# Patient Record
Sex: MALE | Race: BLACK OR AFRICAN AMERICAN | Employment: PART TIME | URBAN - METROPOLITAN AREA
[De-identification: names, ages, dates, MRNs, and addresses within clinical notes are randomized per-mention and may not be internally consistent; named-entity substitution may affect disease eponyms.]

---

## 2020-01-20 ENCOUNTER — HOSPITAL ENCOUNTER (EMERGENCY)
Age: 40
Discharge: HOME | End: 2020-01-21
Attending: EMERGENCY MEDICINE
Payer: MEDICARE

## 2020-01-20 DIAGNOSIS — L03.116 CELLULITIS OF LEG, LEFT: Primary | ICD-10-CM

## 2020-01-20 PROCEDURE — 96375 TX/PRO/DX INJ NEW DRUG ADDON: CPT

## 2020-01-20 PROCEDURE — 96365 THER/PROPH/DIAG IV INF INIT: CPT

## 2020-01-20 PROCEDURE — 99284 EMERGENCY DEPT VISIT MOD MDM: CPT

## 2020-01-21 ENCOUNTER — APPOINTMENT (OUTPATIENT)
Dept: ULTRASOUND IMAGING | Age: 40
End: 2020-01-21
Attending: EMERGENCY MEDICINE
Payer: MEDICARE

## 2020-01-21 VITALS
SYSTOLIC BLOOD PRESSURE: 166 MMHG | RESPIRATION RATE: 16 BRPM | HEIGHT: 71 IN | WEIGHT: 306.88 LBS | TEMPERATURE: 97.9 F | BODY MASS INDEX: 42.96 KG/M2 | DIASTOLIC BLOOD PRESSURE: 100 MMHG | HEART RATE: 79 BPM | OXYGEN SATURATION: 100 %

## 2020-01-21 LAB
ALBUMIN SERPL-MCNC: 2.4 G/DL (ref 3.5–5)
ALBUMIN/GLOB SERPL: 0.6 {RATIO} (ref 1.1–2.2)
ALP SERPL-CCNC: 89 U/L (ref 45–117)
ALT SERPL-CCNC: 35 U/L (ref 12–78)
ANION GAP SERPL CALC-SCNC: 2 MMOL/L (ref 5–15)
AST SERPL-CCNC: 42 U/L (ref 15–37)
BASOPHILS # BLD: 0.1 K/UL (ref 0–0.1)
BASOPHILS NFR BLD: 1 % (ref 0–1)
BILIRUB SERPL-MCNC: 0.2 MG/DL (ref 0.2–1)
BUN SERPL-MCNC: 18 MG/DL (ref 6–20)
BUN/CREAT SERPL: 12 (ref 12–20)
CALCIUM SERPL-MCNC: 8.3 MG/DL (ref 8.5–10.1)
CHLORIDE SERPL-SCNC: 111 MMOL/L (ref 97–108)
CO2 SERPL-SCNC: 28 MMOL/L (ref 21–32)
CREAT SERPL-MCNC: 1.53 MG/DL (ref 0.7–1.3)
DIFFERENTIAL METHOD BLD: NORMAL
EOSINOPHIL # BLD: 0.1 K/UL (ref 0–0.4)
EOSINOPHIL NFR BLD: 1 % (ref 0–7)
ERYTHROCYTE [DISTWIDTH] IN BLOOD BY AUTOMATED COUNT: 13.2 % (ref 11.5–14.5)
GLOBULIN SER CALC-MCNC: 3.7 G/DL (ref 2–4)
GLUCOSE SERPL-MCNC: 91 MG/DL (ref 65–100)
HCT VFR BLD AUTO: 39.4 % (ref 36.6–50.3)
HGB BLD-MCNC: 12.9 G/DL (ref 12.1–17)
IMM GRANULOCYTES # BLD AUTO: 0 K/UL (ref 0–0.04)
IMM GRANULOCYTES NFR BLD AUTO: 0 % (ref 0–0.5)
LACTATE SERPL-SCNC: 0.9 MMOL/L (ref 0.4–2)
LYMPHOCYTES # BLD: 1.6 K/UL (ref 0.8–3.5)
LYMPHOCYTES NFR BLD: 16 % (ref 12–49)
MCH RBC QN AUTO: 27 PG (ref 26–34)
MCHC RBC AUTO-ENTMCNC: 32.7 G/DL (ref 30–36.5)
MCV RBC AUTO: 82.6 FL (ref 80–99)
MONOCYTES # BLD: 0.7 K/UL (ref 0–1)
MONOCYTES NFR BLD: 7 % (ref 5–13)
NEUTS SEG # BLD: 7.4 K/UL (ref 1.8–8)
NEUTS SEG NFR BLD: 75 % (ref 32–75)
NRBC # BLD: 0 K/UL (ref 0–0.01)
NRBC BLD-RTO: 0 PER 100 WBC
PLATELET # BLD AUTO: 273 K/UL (ref 150–400)
PMV BLD AUTO: 10.1 FL (ref 8.9–12.9)
POTASSIUM SERPL-SCNC: 3.8 MMOL/L (ref 3.5–5.1)
PROT SERPL-MCNC: 6.1 G/DL (ref 6.4–8.2)
RBC # BLD AUTO: 4.77 M/UL (ref 4.1–5.7)
SODIUM SERPL-SCNC: 141 MMOL/L (ref 136–145)
WBC # BLD AUTO: 9.9 K/UL (ref 4.1–11.1)

## 2020-01-21 PROCEDURE — 85025 COMPLETE CBC W/AUTO DIFF WBC: CPT

## 2020-01-21 PROCEDURE — 36415 COLL VENOUS BLD VENIPUNCTURE: CPT

## 2020-01-21 PROCEDURE — 74011250636 HC RX REV CODE- 250/636: Performed by: EMERGENCY MEDICINE

## 2020-01-21 PROCEDURE — 80053 COMPREHEN METABOLIC PANEL: CPT

## 2020-01-21 PROCEDURE — 93971 EXTREMITY STUDY: CPT

## 2020-01-21 PROCEDURE — 87040 BLOOD CULTURE FOR BACTERIA: CPT

## 2020-01-21 PROCEDURE — 83605 ASSAY OF LACTIC ACID: CPT

## 2020-01-21 RX ORDER — CLINDAMYCIN PHOSPHATE 600 MG/50ML
600 INJECTION INTRAVENOUS
Status: COMPLETED | OUTPATIENT
Start: 2020-01-21 | End: 2020-01-21

## 2020-01-21 RX ORDER — KETOROLAC TROMETHAMINE 30 MG/ML
30 INJECTION, SOLUTION INTRAMUSCULAR; INTRAVENOUS
Status: COMPLETED | OUTPATIENT
Start: 2020-01-21 | End: 2020-01-21

## 2020-01-21 RX ORDER — CLINDAMYCIN HYDROCHLORIDE 300 MG/1
300 CAPSULE ORAL 4 TIMES DAILY
Qty: 40 CAP | Refills: 0 | Status: SHIPPED | OUTPATIENT
Start: 2020-01-21 | End: 2022-03-04 | Stop reason: ALTCHOICE

## 2020-01-21 RX ORDER — CYCLOBENZAPRINE HCL 10 MG
10 TABLET ORAL
Qty: 20 TAB | Refills: 0 | Status: SHIPPED | OUTPATIENT
Start: 2020-01-21 | End: 2020-01-21

## 2020-01-21 RX ORDER — CLINDAMYCIN HYDROCHLORIDE 300 MG/1
300 CAPSULE ORAL 4 TIMES DAILY
Qty: 40 CAP | Refills: 0 | Status: SHIPPED | OUTPATIENT
Start: 2020-01-21 | End: 2020-01-21

## 2020-01-21 RX ORDER — CYCLOBENZAPRINE HCL 10 MG
10 TABLET ORAL
Qty: 20 TAB | Refills: 0 | Status: SHIPPED | OUTPATIENT
Start: 2020-01-21 | End: 2022-03-04

## 2020-01-21 RX ADMIN — CLINDAMYCIN PHOSPHATE 600 MG: 600 INJECTION, SOLUTION INTRAVENOUS at 01:14

## 2020-01-21 RX ADMIN — KETOROLAC TROMETHAMINE 30 MG: 30 INJECTION, SOLUTION INTRAMUSCULAR at 01:14

## 2020-01-21 NOTE — ED NOTES
Dr. Yousif Santos reviewed discharge instructions with the patient. The patient verbalized understanding. All questions and concerns were addressed. The patient declined a wheelchair and is discharged ambulatory in the care of family members with instructions and prescriptions in hand. Pt is alert and oriented x 4. Respirations are clear and unlabored.

## 2020-01-21 NOTE — ED PROVIDER NOTES
EMERGENCY DEPARTMENT HISTORY AND PHYSICAL EXAM      Date: 1/20/2020  Patient Name: Emma Yu    History of Presenting Illness     Chief Complaint   Patient presents with    Leg Swelling     pt. reports left leg and foot swelling x3 weeks, has gotten progressively worse since this weekend. has happened before, but usually due to diabetic infection       History Provided By: Patient    HPI: Emma Yu, 37 y.o. male presents to the ED with cc of left leg swelling and pain. The patient's symptoms started 3 weeks ago. He denies any known trauma. He has chronic back pain, which is of mild severity. He has chronic neuropathy in both legs. He denies fever, chills, chest pain or shortness of breath. He has had swelling and pain in his leg previously, and was told it was from a diabetic infection. She states that the pain is much worse this time. Currently, the pain is an 8 out of 10 in severity. He has been taking Motrin for the pain with some improvement of symptoms. He denies any recent long distance travel. There are no other complaints, changes, or physical findings at this time. PCP: None    No current facility-administered medications on file prior to encounter. Current Outpatient Medications on File Prior to Encounter   Medication Sig Dispense Refill    lisinopril (PRINIVIL, ZESTRIL) 20 mg tablet Take 1 Tab by mouth daily. 90 Tab 3    simvastatin (ZOCOR) 10 mg tablet Take 1 Tab by mouth nightly. 30 Tab 6    insulin NPH (NOVOLIN N, HUMULIN N) 100 unit/mL injection 14 units subcutaneously once before breakfast and once before dinner twice daily for a total of 28 units 1 Vial 6    metFORMIN (GLUCOPHAGE) 1,000 mg tablet Take 1 Tab by mouth two (2) times daily (with meals). 60 Tab 6    amLODIPine (NORVASC) 5 mg tablet Take 1 Tab by mouth daily. 30 Tab 0    aspirin delayed-release 81 mg tablet Take 1 Tab by mouth daily.  30 Tab 11    gabapentin (NEURONTIN) 300 mg capsule Take 1 Cap by mouth three (3) times daily. 90 Cap 1    magnesium chloride (SLOW MAG) 71.5 mg tablet Take 1 Tab by mouth daily. 30 Tab 1    glucose blood VI test strips (ASCENSIA CONTOUR) strip Test blood glucose twice daily 100 Strip 11    Blood-Glucose Meter monitoring kit Twice daily once premeal and once 1-2 hrs after meal 1 Kit 0    Lancets Misc Twice daily 1 Package 11       Past History     Past Medical History:  Past Medical History:   Diagnosis Date    [facility]     Diabetes ([facility]     Encounter for completion of form with patient 5/5/2016    Foot pain, left 8/25/2015    High risk sexual behavior 3/14/2012    >1 partner per year    Hypertension     Neuropathy in diabetes ([facility]     [facility]     Teeth problem     Type II diabetes mellitus, uncontrolled ([facility] 8/4/2016       Past Surgical History:  Past Surgical History:   Procedure Laterality Date    HX ORTHOPAEDIC      R foot    HX OTHER SURGICAL      boil drained on buttock     HX OTHER SURGICAL  4/30/15    INGUINAL LYMPH NODE BIOPSY    HX OTHER SURGICAL  4/30/15    Partial  amputation left toes 2 and 3 left foot       Family History:  Family History   Problem Relation Age of Onset    Hypertension Mother     Hypertension Other     Diabetes Other        Social History:  Social History     Tobacco Use    Smoking status: Former Smoker     Packs/day: 0.25     Years: 10.00     Pack years: 2.50    Smokeless tobacco: Never Used    Tobacco comment: 2-3 cigarettes a day   Substance Use Topics    Alcohol use: Yes     Alcohol/week: 0.0 standard drinks     Comment: once a week, uses vapor cigarettes    Drug use: No       Allergies:  No Known Allergies      Review of Systems   Review of Systems   Constitutional: Negative for chills and fever. HENT: Negative for congestion. Eyes: Negative. Respiratory: Negative for shortness of breath. Cardiovascular: Negative for chest pain. Gastrointestinal: Negative for abdominal pain. Endocrine: Negative for heat intolerance. Genitourinary: Negative. Musculoskeletal: Positive for back pain. Skin: Negative for pallor. Allergic/Immunologic: Negative for immunocompromised state. Neurological: Negative for light-headedness. Hematological: Does not bruise/bleed easily. Psychiatric/Behavioral: Negative. All other systems reviewed and are negative. Physical Exam   Physical Exam  Vitals signs and nursing note reviewed. Constitutional:       General: He is not in acute distress. Appearance: He is well-developed. He is not diaphoretic. HENT:      Head: Normocephalic and atraumatic. Eyes:      Pupils: Pupils are equal, round, and reactive to light. Neck:      Musculoskeletal: Normal range of motion and neck supple. Cardiovascular:      Rate and Rhythm: Normal rate and regular rhythm. Pulses: Normal pulses. Heart sounds: Normal heart sounds. No murmur. No friction rub. Pulmonary:      Effort: Pulmonary effort is normal. No respiratory distress. Breath sounds: Normal breath sounds. No wheezing or rales. Chest:      Chest wall: No tenderness. Abdominal:      General: Bowel sounds are normal. There is no distension. Palpations: Abdomen is soft. Tenderness: There is no tenderness. There is no guarding or rebound. Musculoskeletal: Normal range of motion. General: No tenderness. Skin:     General: Skin is warm and dry. Coloration: Skin is not pale. Neurological:      General: No focal deficit present. Mental Status: He is alert and oriented to person, place, and time. Motor: No abnormal muscle tone.       Coordination: Coordination normal.   Psychiatric:         Mood and Affect: Mood normal.         Behavior: Behavior normal.         Diagnostic Study Results     Labs -     Recent Results (from the past 12 hour(s))   CBC WITH AUTOMATED DIFF    Collection Time: 01/21/20 12:18 AM   Result Value Ref Range    WBC 9.9 4.1 - 11.1 K/uL    RBC 4.77 4.10 - 5.70 M/uL    HGB 12.9 12.1 - 17.0 g/dL    HCT 39.4 36.6 - 50.3 %    MCV 82.6 80.0 - 99.0 FL    MCH 27.0 26.0 - 34.0 PG    MCHC 32.7 30.0 - 36.5 g/dL    RDW 13.2 11.5 - 14.5 %    PLATELET 846 793 - 050 K/uL    MPV 10.1 8.9 - 12.9 FL    NRBC 0.0 0  WBC    ABSOLUTE NRBC 0.00 0.00 - 0.01 K/uL    NEUTROPHILS 75 32 - 75 %    LYMPHOCYTES 16 12 - 49 %    MONOCYTES 7 5 - 13 %    EOSINOPHILS 1 0 - 7 %    BASOPHILS 1 0 - 1 %    IMMATURE GRANULOCYTES 0 0.0 - 0.5 %    ABS. NEUTROPHILS 7.4 1.8 - 8.0 K/UL    ABS. LYMPHOCYTES 1.6 0.8 - 3.5 K/UL    ABS. MONOCYTES 0.7 0.0 - 1.0 K/UL    ABS. EOSINOPHILS 0.1 0.0 - 0.4 K/UL    ABS. BASOPHILS 0.1 0.0 - 0.1 K/UL    ABS. IMM. GRANS. 0.0 0.00 - 0.04 K/UL    DF AUTOMATED     METABOLIC PANEL, COMPREHENSIVE    Collection Time: 01/21/20 12:18 AM   Result Value Ref Range    Sodium 141 136 - 145 mmol/L    Potassium 3.8 3.5 - 5.1 mmol/L    Chloride 111 (H) 97 - 108 mmol/L    CO2 28 21 - 32 mmol/L    Anion gap 2 (L) 5 - 15 mmol/L    Glucose 91 65 - 100 mg/dL    BUN 18 6 - 20 MG/DL    Creatinine 1.53 (H) 0.70 - 1.30 MG/DL    BUN/Creatinine ratio 12 12 - 20      GFR est AA >60 >60 ml/min/1.73m2    GFR est non-AA 50 (L) >60 ml/min/1.73m2    Calcium 8.3 (L) 8.5 - 10.1 MG/DL    Bilirubin, total 0.2 0.2 - 1.0 MG/DL    ALT (SGPT) 35 12 - 78 U/L    AST (SGOT) 42 (H) 15 - 37 U/L    Alk. phosphatase 89 45 - 117 U/L    Protein, total 6.1 (L) 6.4 - 8.2 g/dL    Albumin 2.4 (L) 3.5 - 5.0 g/dL    Globulin 3.7 2.0 - 4.0 g/dL    A-G Ratio 0.6 (L) 1.1 - 2.2     LACTIC ACID    Collection Time: 01/21/20 12:18 AM   Result Value Ref Range    Lactic acid 0.9 0.4 - 2.0 MMOL/L       Radiologic Studies -   No orders to display     CT Results  (Last 48 hours)    None        CXR Results  (Last 48 hours)    None          Medical Decision Making   I am the first provider for this patient.     I reviewed the vital signs, available nursing notes, past medical history, past surgical history, family history and social history. Vital Signs-Reviewed the patient's vital signs. Patient Vitals for the past 12 hrs:   Temp Pulse Resp BP SpO2   01/21/20 0015    (!) 170/98 98 %   01/20/20 2350 97.8 °F (36.6 °C) 96 18 (!) 205/93 98 %           Records Reviewed: Nursing Notes, Old Medical Records, Previous Radiology Studies and Previous Laboratory Studies    Provider Notes (Medical Decision Making):   Cellulitis, DVT, Baker's cyst, neuropathy    ED Course:   Initial assessment performed. The patients presenting problems have been discussed, and they are in agreement with the care plan formulated and outlined with them. I have encouraged them to ask questions as they arise throughout their visit. Progress note: The patient's results were reviewed. The patient is feeling better. He is advised to follow-up and return to ER if worse         Critical Care Time:   0    Disposition:  Home    PLAN:  1. Discharge Medication List as of 1/21/2020  2:36 AM      START taking these medications    Details   clindamycin (CLEOCIN) 300 mg capsule Take 1 Cap by mouth four (4) times daily. , Normal, Disp-40 Cap, R-0      cyclobenzaprine (FLEXERIL) 10 mg tablet Take 1 Tab by mouth three (3) times daily as needed for Muscle Spasm(s). , Normal, Disp-20 Tab, R-0         CONTINUE these medications which have NOT CHANGED    Details   lisinopril (PRINIVIL, ZESTRIL) 20 mg tablet Take 1 Tab by mouth daily. , Normal, Disp-90 Tab, R-3      simvastatin (ZOCOR) 10 mg tablet Take 1 Tab by mouth nightly., Normal, Disp-30 Tab, R-6      insulin NPH (NOVOLIN N, HUMULIN N) 100 unit/mL injection 14 units subcutaneously once before breakfast and once before dinner twice daily for a total of 28 units, Print, Disp-1 Vial, R-6      metFORMIN (GLUCOPHAGE) 1,000 mg tablet Take 1 Tab by mouth two (2) times daily (with meals). , Normal, Disp-60 Tab, R-6      amLODIPine (NORVASC) 5 mg tablet Take 1 Tab by mouth daily. , Print, Disp-30 Tab, R-0 aspirin delayed-release 81 mg tablet Take 1 Tab by mouth daily. , Normal, Disp-30 Tab, R-11      gabapentin (NEURONTIN) 300 mg capsule Take 1 Cap by mouth three (3) times daily. , Print, Disp-90 Cap, R-1      magnesium chloride (SLOW MAG) 71.5 mg tablet Take 1 Tab by mouth daily. , Print, Disp-30 Tab, R-1      glucose blood VI test strips (ASCENSIA CONTOUR) strip Test blood glucose twice daily, Normal, Disp-100 Strip, R-11      Blood-Glucose Meter monitoring kit Twice daily once premeal and once 1-2 hrs after meal, Normal, Disp-1 Kit, R-0      Lancets Misc Twice daily, Normal, Disp-1 Package, R-11           2. Follow-up Information     Follow up With Specialties Details Why Contact Info    See a PCP or clinic doctor  In 2 days As needed     [facility] EMERGENCY DEPT Emergency Medicine  If symptoms worsen [address] [place] [address]  [address] [facility]  [phone]        Return to ED if worse     Diagnosis     Clinical Impression:   1. Cellulitis of leg, left        Attestations:    Zhane Romero MD    Please note that this dictation was completed with [employer] the [employer] voice recognition software. Quite often unanticipated grammatical, syntax, homophones, and other interpretive errors are inadvertently transcribed by the computer software. Please disregard these errors. Please excuse any errors that have escaped final proofreading. Thank you.

## 2020-01-21 NOTE — DISCHARGE INSTRUCTIONS
Patient Education        Cellulitis: Care Instructions  Your Care Instructions    Cellulitis is a skin infection caused by bacteria, most often strep or staph. It often occurs after a break in the skin from a scrape, cut, bite, or puncture, or after a rash. Cellulitis may be treated without doing tests to find out what caused it. But your doctor may do tests, if needed, to look for a specific bacteria, like methicillin-resistant Staphylococcus aureus (MRSA). The doctor has checked you carefully, but problems can develop later. If you notice any problems or new symptoms, get medical treatment right away. Follow-up care is a key part of your treatment and safety. Be sure to make and go to all appointments, and call your doctor if you are having problems. It's also a good idea to know your test results and keep a list of the medicines you take. How can you care for yourself at home? · Take your antibiotics as directed. Do not stop taking them just because you feel better. You need to take the full course of antibiotics. · Prop up the infected area on pillows to reduce pain and swelling. Try to keep the area above the level of your heart as often as you can. · If your doctor told you how to care for your wound, follow your doctor's instructions. If you did not get instructions, follow this general advice:  ? Wash the wound with clean water 2 times a day. Don't use hydrogen peroxide or alcohol, which can slow healing. ? You may cover the wound with a thin layer of petroleum jelly, such as Vaseline, and a nonstick bandage. ? Apply more petroleum jelly and replace the bandage as needed. · Be safe with medicines. Take pain medicines exactly as directed. ? If the doctor gave you a prescription medicine for pain, take it as prescribed. ? If you are not taking a prescription pain medicine, ask your doctor if you can take an over-the-counter medicine.   To prevent cellulitis in the future  · Try to prevent cuts, scrapes, or other injuries to your skin. Cellulitis most often occurs where there is a break in the skin. · If you get a scrape, cut, mild burn, or bite, wash the wound with clean water as soon as you can to help avoid infection. Don't use hydrogen peroxide or alcohol, which can slow healing. · If you have swelling in your legs (edema), support stockings and good skin care may help prevent leg sores and cellulitis. · Take care of your feet, especially if you have diabetes or other conditions that increase the risk of infection. Wear shoes and socks. Do not go barefoot. If you have athlete's foot or other skin problems on your feet, talk to your doctor about how to treat them. When should you call for help? Call your doctor now or seek immediate medical care if:    · You have signs that your infection is getting worse, such as:  ? Increased pain, swelling, warmth, or redness. ? Red streaks leading from the area. ? Pus draining from the area. ? A fever.     · You get a rash.    Watch closely for changes in your health, and be sure to contact your doctor if:    · You do not get better as expected. Where can you learn more? Go to http://lorri-pooja.info/. Concepcion Sample in the search box to learn more about \"Cellulitis: Care Instructions. \"  Current as of: April 1, 2019  Content Version: 12.2  © 0541-4048 [employer]. Care instructions adapted under license by [employer] (which disclaims liability or warranty for this information). If you have questions about a medical condition or this instruction, always ask your healthcare professional. [place] [address] any warranty or liability for your use of this information. [facility] Departments     For adult and child immunizations, family planning, TB screening, STD testing and women's health services.    Christel: [place] [phone]      Cornel ROTHMAN [address] [facility]   [phone]    [address]   [phone]    Starr: Emblem [address] [phone]      [address]          [place] [place] [address]     For primary care services, woman and child wellness, and some clinics providing specialty care. [facility] -- [facility] [facility]. [address] [place] [address] [phone]   [address] [facility] 200 [facility] 3614 [facility] [phone]   [address] [facility] [address] [phone]   [address] [facility] [address] [place] [address] 5850  Community  [phone]   [address] [place] [address] [address] [phone]   [facility] [address] [phone]   [address] [phone]   [facility]: [facility] [address] [facility] [phone]     [address] [phone]   [facility] [address]  Community  [phone]   [employer]  [address] (www.Xochitl (So[employer]Shee) Gold mines/about/mission. asp) [phone]         Sexual Health/Woman Wellness Clinics    For STD/HIV testing and treatment, pregnancy testing and services, men's health, birth control services, LGBT services, and hepatitis/HPV vaccine services. [employer] for [place] [employer] [address] [facility] [address] [facility] [address] Main [facility] 1579 [address] [facility] [phone]   [facility] [address], 5th floor [phone]   Pregnancy [address] Women [address]  Estephania Hughes [phone]         [address] [facility] 94 [facility]   [phone]   [place]   [phone]   Women, Infant and Children's Services: [place] [address] [phone] [address] [phone]   [facility]   [phone]   [address]   [facility]     [phone]   [address] Crisis   [address] [facility] [address] [phone]     Local Primary Care Physicians  [facility] [phone]  MD Allison Lee MD Prescilla Davidson, MD [facility] Community Doctors [phone]  Flakito Singh, [facility]  MD Jennifer Cota MD Townsend Dill, MD [address] [facility] [phone]  Armon [place]as[place] [address] [facility] [address] [phone]  MD Jhon Mckeon, MD Lazaro Elkins MD   [facility] [phone]  MD Stanton Dalal, MD Julio Cabral, NP [address] [facility] [address] [phone]  MD Angle Tavera MD Vonzell Buoy, MD Maura Reyes, MD Florence Nina, MD Shailesh Kunz, MD Lita Ruiz MD   [address] [facility] [phone]  Brad Jurado MD [address] [phone]  MD Ashvin Hayden, NP  Iram Baum, MD Lisa Floyd, MD Edgar Bae, MD Denita Doss MD   [address] [facility] [phone]  MD Ada Jordan, P  Benji Sommer, NP  Roberta Matute, MD Marcela Bassett, MD Melisa Aj MD [facility] [phone]  MD Ryan Galdamez MD Suzy Castellani, MD Gershon Lime, MD Glenna Day, MD   [address] [phone]  MD Laury Bosch MD [place] [phone]  MD Ginny Cooper, MD Chelsea Huynh, MD   [facility] [phone]  Rashida Gomez, MD Shelli Irizarry, MD Beryle Dire, MD Francia Kearns, MD Tylor Du, NP  Dee Squires, MD [facility]   [phone]  Arvie Sane, MD Renella Burkitt, MD Aden Sousa, MD   [address] [facility] [phone]  [address] [place] [address] MD Stephanie Sexton, FUNMI Quiroz, FRANCIS Quiroz, FUNMI Lenz, MD Kirsty Anne, NP   Yadira Burr DO Miscellaneous:  Serina Severance, [phone]

## 2020-01-21 NOTE — ED NOTES
56- Dr. Dominguez Stands at bedside for evaluation. [facility] Dr. Dominguez Stands called Radiology for [phone] Cultures obtained prior to antibx adminstration.   0130- Pt went to [address] [facility] [address]

## 2020-01-26 LAB
BACTERIA SPEC CULT: NORMAL
SERVICE CMNT-IMP: NORMAL

## 2022-03-04 ENCOUNTER — HOSPITAL ENCOUNTER (OUTPATIENT)
Dept: WOUND CARE | Age: 42
Discharge: HOME | End: 2022-03-04
Payer: MEDICARE

## 2022-03-04 VITALS
WEIGHT: 290 LBS | DIASTOLIC BLOOD PRESSURE: 78 MMHG | HEIGHT: 70 IN | BODY MASS INDEX: 41.52 KG/M2 | SYSTOLIC BLOOD PRESSURE: 141 MMHG | RESPIRATION RATE: 18 BRPM | HEART RATE: 103 BPM | TEMPERATURE: 98.1 F

## 2022-03-04 PROCEDURE — 99203 OFFICE O/P NEW LOW 30 MIN: CPT | Performed by: PODIATRIST

## 2022-03-04 PROCEDURE — 11042 DBRDMT SUBQ TIS 1ST 20SQCM/<: CPT | Performed by: PODIATRIST

## 2022-03-04 PROCEDURE — 99213 OFFICE O/P EST LOW 20 MIN: CPT | Performed by: PODIATRIST

## 2022-03-04 PROCEDURE — 74011000250 HC RX REV CODE- 250: Performed by: PODIATRIST

## 2022-03-04 RX ORDER — INSULIN ASPART 100 [IU]/ML
INJECTION, SUSPENSION SUBCUTANEOUS
COMMUNITY

## 2022-03-04 RX ADMIN — Medication: at 09:12

## 2022-03-04 NOTE — WOUND CARE
Baltazar Walker DPM - Nira Kanner K. Antonio Messing Floy, DPM - Stoney Patiño, MARISSA Ascencio [address] - H&P   Assessment/Plan:  Type 2 Diabetes with foot ulcer (E11.621)  Non pressure chronic ulcer right foot to the level of fat (L97.512)      - Pt evaluated and treated. - Wound debrided as noted in the Procedure Note to healthy granular bleeding margins.  - Discussed need for transportation to have TCC applied  - Dressing consisting of  GV, marc /aquacel applied. - Offloading achieved with custom offloading insole  - F/U 1 week. Plan to order xrays, consider MRI    Subjective:  Pt complains of wound to right foot. Previous tx include local wound care with betadine/dsd and debridement by NP weekly and podiatry monthly. neg for fever, chills, nausea, vomiting, chest pain, shortness of breath. HPI: wound present x months, patient has had hospitalizations and toe amputations in the past. Wants TCC to heal wound as fast as possible. Relates variable blood sugar control, sometimes high in the 200s. Goals are to heal and await kidney transplant. ROS:  Consitutional: no weight loss, night sweats, fatigue / malaise / lethargy. Musculoskeletal: no joint / extremity pain, misalignment, stiffness, decreased ROM, crepitus. Integument: No pruritis, rashes, lesions, right foot wounds.   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     Chronic kidney disease     Diabetes ([facility]     Encounter for completion of form with patient 5/5/2016    Foot pain, left 8/25/2015    High risk sexual behavior 3/14/2012    >1 partner per year  Hypertension     [facility]     Osteomyelitis [facility]     Teeth problem     Type II diabetes mellitus, uncontrolled ([facility] 8/4/2016     Past Surgical History:   Procedure Laterality Date    HX ORTHOPAEDIC      R foot    HX OTHER SURGICAL      boil drained on buttock     HX OTHER SURGICAL  4/30/15    INGUINAL LYMPH NODE BIOPSY    HX OTHER SURGICAL  4/30/15    Partial  amputation left toes 2 and 3 left foot    HX VASCULAR ACCESS      Fistula im left arm     Social History     Tobacco Use    Smoking status: Former Smoker     Packs/day: 0.25     Years: 10.00     Pack years: 2.50    Smokeless tobacco: Never Used    Tobacco comment: 2-3 cigarettes a day   Substance Use Topics    Alcohol use: Yes     Alcohol/week: 0.0 standard drinks     Comment: once a week       Social History     Substance and Sexual Activity   Alcohol Use Yes    Alcohol/week: 0.0 standard drinks    Comment: once a week     Social History     Substance and Sexual Activity   Drug Use No      Social History     Tobacco Use   Smoking Status Former Smoker    Packs/day: 0.25    Years: 10.00    Pack years: 2.50   Smokeless Tobacco Never Used   Tobacco Comment    2-3 cigarettes a day     Current Outpatient Medications   Medication Sig    insulin aspart protamine/insulin aspart (NOVOLOG MIX 70/30) 100 unit/mL (70-30) inpn by SubCUTAneous route.  amLODIPine (NORVASC) 5 mg tablet Take 1 Tab by mouth daily.  gabapentin (NEURONTIN) 300 mg capsule Take 1 Cap by mouth three (3) times daily.  glucose blood VI test strips (ASCENSIA CONTOUR) strip Test blood glucose twice daily    Blood-Glucose Meter monitoring kit Twice daily once premeal and once 1-2 hrs after meal    Lancets Misc Twice daily     No current facility-administered medications for this encounter.         Objective:  Visit Vitals  BP (!) 141/78 (BP 1 Location: Right upper arm, BP Patient Position: Reclining)   Pulse (!) 103   Temp 98.1 °F (36.7 °C)   Resp 18 Ht 5' 10\" (1.778 m)   Wt 131.5 kg (290 lb)   BMI 41.61 kg/m²       Vascular:  B/L LE  DP 2/4; PT 2/4  capillary fill time brisk, pitting edema is present, skin temperature is cool, varicosities are present. Dermatological:    Wound: right plantar first ray  Measurements: per RN note  Margins: macerated, hyperkeratotic  Drainage: serous  Odor: mild  Wound base: 100% granular  Lymphangitic streaking? No.  Undermining? No.  Sinus tracts? No.  Exposed bone? No.  Subcutaneous crepitation on palpation? No.            There is no maceration of the interspaces of the feet b/l. Neurological:  DTR are present, protective sensation per 5.07 [place] Leeann monofilament is absent, patient is AAOx3, mood is normal. Epicritic sensation is intact. Orthopedic:  B/L LE are symmetric, ROM of ankle, STJ, 1st MTPJ is limited, MMT 5 out of 5 for B/L LE. There has been an amputation of right first and fifth rays and toes to left foot. Constitutional: Pt is a well developed male. Lymphatics: negative tenderness to palpation of neck/axillary/inguinal nodes. Imaging / Labs / Cx / Px:  Reviewed      Procedure Note:  Excisional debridement through level of fat. Location / Ulcer: right plantar foot  Indication: to remove non-viable tissue from wound bed. Consent in chart. Anesthesia: topical lidocaine  Instrument: curette, #15 blade  Residual necrosis: none  Bleeding: minimal  Hemostasis: compression  Pre-Procedure Pain: 0  Post-Procedure Pain: 0  Area debrided < 20 cm sq. Pre-Debridement measurements: see nursing notes  Post-Debridement measurements: see nursing notes, added depth of 0.1 cm  This is part of a series of staged procedures in an attempt at limb salvage.

## 2022-03-04 NOTE — WOUND CARE
Visit Vitals  BP (!) 141/78 (BP 1 Location: Right upper arm, BP Patient Position: Reclining)   Pulse (!) 103   Temp 98.1 °F (36.7 °C)   Resp 18   Ht 5' 10\" (1.778 m)   Wt 131.5 kg (290 lb)   BMI 41.61 kg/m²        03/04/22 0910   Anesthetic   Anesthetic 4% Lidocaine Liquid Topical   Right Leg Edema Point of Measurement   Leg circumference 42.5 cm   Ankle circumference 25.5 cm   Left Leg Edema Point of Measurement   Leg circumference 42 cm   Ankle circumference 25 cm   LLE Peripheral Vascular    Capillary Refill Less than/equal to 3 seconds   Color Appropriate for race   Temperature Warm   Sensation Decreased   Pedal Pulse Palpable   RLE Peripheral Vascular    Capillary Refill Less than/equal to 3 seconds   Color Appropriate for race   Temperature Warm   Sensation Decreased   Pedal Pulse Palpable   Wound Foot Right;Plantar #1   Date First Assessed/Time First Assessed: 03/04/22 0909   Present on Hospital Admission: Yes  Primary Wound Type: Diabetic Ulcer  Location: Foot  Wound Location Orientation: Right;Plantar  Wound Description: #1   Wound Image    Wound Etiology Diabetic   Wound Length (cm) 0.9 cm   Wound Width (cm) 1 cm   Wound Depth (cm) 0.8 cm   Wound Surface Area (cm^2) 0.9 cm^2   Wound Volume (cm^3) 0.72 cm^3   Undermining Starts ___ O'Clock 10 o'clock   Undermining Ends ___ O'Clock 12 o'clock   Undermining Maximum Distance (cm) 0.8 cm   Wound Assessment Normal/red;Slough   Drainage Amount Moderate   Drainage Description Serosanguinous   Wound Odor None   Pham-Wound/Incision Assessment Hyperkeratosis (Callous); Maceration   Edges Flat/open edges   Wound Thickness Description Full thickness

## 2022-03-04 NOTE — WOUND CARE
03/04/22 0928   Wound Foot Right;Plantar #1   Date First Assessed/Time First Assessed: 03/04/22 0909   Present on Hospital Admission: Yes  Primary Wound Type: Diabetic Ulcer  Location: Foot  Wound Location Orientation: Right;Plantar  Wound Description: #1   Dressing/Treatment Collagen with Ag;Alginate with Ag;Gauze dressing/dressing sponge;Tape/Soft cloth adhesive tape   Offloading for Diabetic Foot Ulcers Diabetic shoes/inserts   Discharge Condition: Stable     Pain: 0    Ambulatory Status: Walking    Discharge Destination: Home     Transportation: Car    Accompanied by: Self     Discharge instructions reviewed with Patient and copy or written instructions have been provided. All questions/concerns have been addressed at this time.

## 2022-03-11 ENCOUNTER — HOSPITAL ENCOUNTER (OUTPATIENT)
Dept: WOUND CARE | Age: 42
Discharge: HOME | End: 2022-03-11

## 2022-03-11 NOTE — DISCHARGE INSTRUCTIONS
Discharge Instructions for  [facility]  [address] [place], [address] [facility]  Telephone: [phone]    NAME:  Krish Newton  YOB: 1980  DATE:  3/11/2022    [] Wound and dressing supply provider: {[facility] provider:[id number]}        Wound Cleansing:   Do not scrub or use excessive force. Cleanse wound prior to applying a clean dressing with:    [] Normal Saline   [] Keep Wound Dry in Shower      [] Wound Cleanser (***)  [] May Shower at Discharge   [x] cleanse with Fabiola Luke and Fabiola Luke baby shampoo lather leave 2-3 then rinse with water, pat dry and redress wound. Dressings:           Wound Location right plantar foot    Nadine aquacel ag gauze tape                                                   []     Change dressing:   [] Daily      [x] Every Other Day   [] Three times per week  [] Once a week   [] Do Not Change Dressing     [] Other:    Off-Loading:   [x] Off-loading when [x] walking  [] in bed [] sitting    Dietary:  [x] Diet as tolerated: [] Diabetic Diet:   [x] Increase Protein: examples ( Meat, cheese, eggs, greek yogurt, premier protein drink, fish, nuts )   [] Other:    Return Appointment:  [] Wound assessment with Nurse at wound center in *** days     [] Return Appointment: With Dr. Esteban Caro  1week    [] Ordered tests: {OP Wound Vascular Studies:99249} {OP Wound F/U Imagin}     Electronically signed on 3\2022 at 8:08 AJIT Ortiz 8 Information: Should you experience any significant changes in your wound(s) or have questions about your wound care, please contact the [facility] at [address] [place] [address] 8:00 am - 4:30. If you need help with your wound outside these hours and cannot wait until we are again available, contact your PCP or go to the hospital emergency room.      PLEASE NOTE: IF YOU ARE UNABLE TO OBTAIN WOUND SUPPLIES, CONTINUE TO USE THE SUPPLIES YOU HAVE AVAILABLE UNTIL YOU ARE ABLE TO REACH US. IT IS MOST IMPORTANT TO KEEP THE WOUND COVERED AT ALL TIMES.      Physician Signature:_______________________  Dr. Marce Sandifer

## 2022-03-25 ENCOUNTER — HOSPITAL ENCOUNTER (OUTPATIENT)
Dept: WOUND CARE | Age: 42
Discharge: HOME | End: 2022-03-25
Payer: MEDICARE

## 2022-03-25 VITALS
DIASTOLIC BLOOD PRESSURE: 77 MMHG | TEMPERATURE: 98.1 F | SYSTOLIC BLOOD PRESSURE: 120 MMHG | RESPIRATION RATE: 18 BRPM | HEART RATE: 99 BPM

## 2022-03-25 PROCEDURE — 11042 DBRDMT SUBQ TIS 1ST 20SQCM/<: CPT | Performed by: PODIATRIST

## 2022-03-25 NOTE — WOUND CARE
Tierney Schwarz DPM - Ricky Michelle DPM - Amrita Poag, [address] - Follow up  Assessment/Plan:  Type 2 Diabetes with foot ulcer (E11.621)  Non pressure chronic ulcer right foot to the level of fat (L97.512)      - Pt evaluated and treated. - Wound debrided as noted in the Procedure Note to healthy granular bleeding margins.  - Discussed need for transportation to have TCC applied weekly  - Dressing consisting of  GV, marc /aquacel applied. - Offloading achieved with custom offloading insole  - F/U at [facility]    Subjective:  Pt complains of wound to right foot. Previous tx include local wound care with betadine/dsd and debridement by NP weekly and podiatry monthly. neg for fever, chills, nausea, vomiting, chest pain, shortness of breath. HPI: wound present x months, patient has had hospitalizations and toe amputations in the past. Wants TCC to heal wound as fast as possible. Relates variable blood sugar control, sometimes high in the 200s. Goals are to heal and await kidney transplant. ROS:  Consitutional: no weight loss, night sweats, fatigue / malaise / lethargy. Musculoskeletal: no joint / extremity pain, misalignment, stiffness, decreased ROM, crepitus. Integument: No pruritis, rashes, lesions, right foot wounds.   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[facility]     Chronic kidney disease     Diabetes ([facility]     Encounter for completion of form with patient 5/5/2016    Foot pain, left 8/25/2015    High risk sexual behavior 3/14/2012    >1 partner per year    Hypertension  [facility]uropathy in [facility] ([facility]     Osteomyelitis ([facility]     Teeth problem     Type II diabetes mellitus, uncontrolled ([facility] 8/4/2016     Past Surgical History:   Procedure Laterality Date    HX ORTHOPAEDIC      R foot    HX OTHER SURGICAL      boil drained on buttock     HX OTHER SURGICAL  4/30/15    INGUINAL LYMPH NODE BIOPSY    HX OTHER SURGICAL  4/30/15    Partial  amputation left toes 2 and 3 left foot    HX VASCULAR ACCESS      Fistula im left arm     Social History     Tobacco Use    Smoking status: Former Smoker     Packs/day: 0.25     Years: 10.00     Pack years: 2.50    Smokeless tobacco: Never Used    Tobacco comment: 2-3 cigarettes a day   Substance Use Topics    Alcohol use: Yes     Alcohol/week: 0.0 standard drinks     Comment: once a week       Social History     Substance and Sexual Activity   Alcohol Use Yes    Alcohol/week: 0.0 standard drinks    Comment: once a week     Social History     Substance and Sexual Activity   Drug Use No      Social History     Tobacco Use   Smoking Status Former Smoker    Packs/day: 0.25    Years: 10.00    Pack years: 2.50   Smokeless Tobacco Never Used   Tobacco Comment    2-3 cigarettes a day     Current Outpatient Medications   Medication Sig    insulin aspart protamine/insulin aspart (NOVOLOG MIX 70/30) 100 unit/mL (70-30) inpn by SubCUTAneous route.  amLODIPine (NORVASC) 5 mg tablet Take 1 Tab by mouth daily.  gabapentin (NEURONTIN) 300 mg capsule Take 1 Cap by mouth three (3) times daily.  glucose blood VI test strips (ASCENSIA CONTOUR) strip Test blood glucose twice daily    Blood-Glucose Meter monitoring kit Twice daily once premeal and once 1-2 hrs after meal    Lancets Misc Twice daily     No current facility-administered medications for this encounter.         Objective:  Visit Vitals  /77 (BP 1 Location: Right upper arm, BP Patient Position: Sitting)   Pulse 99   Temp 98.1 °F (36.7 °C)   Resp 18       Vascular:  B/L LE  DP 2/4; PT 2/4  capillary fill time brisk, pitting edema is present, skin temperature is cool, varicosities are present. Dermatological:    Wound: right plantar first ray  Measurements: per RN note  Margins: macerated, hyperkeratotic  Drainage: serous  Odor: mild  Wound base: 100% granular  Lymphangitic streaking? No.  Undermining? No.  Sinus tracts? No.  Exposed bone? No.  Subcutaneous crepitation on palpation? No.            There is no maceration of the interspaces of the feet b/l. Neurological:  DTR are present, protective sensation per 5.07 [place] Leeann monofilament is absent, patient is AAOx3, mood is normal. Epicritic sensation is intact. Orthopedic:  B/L LE are symmetric, ROM of ankle, STJ, 1st MTPJ is limited, MMT 5 out of 5 for B/L LE. There has been an amputation of right first and fifth rays and toes to left foot. Constitutional: Pt is a well developed male. Lymphatics: negative tenderness to palpation of neck/axillary/inguinal nodes. Imaging / Labs / Cx / Px:  Reviewed      Procedure Note:  Excisional debridement through level of fat. Location / Ulcer: right plantar foot  Indication: to remove non-viable tissue from wound bed. Consent in chart. Anesthesia: topical lidocaine  Instrument: curette, #15 blade  Residual necrosis: none  Bleeding: minimal  Hemostasis: compression  Pre-Procedure Pain: 0  Post-Procedure Pain: 0  Area debrided < 20 cm sq. Pre-Debridement measurements: see nursing notes  Post-Debridement measurements: see nursing notes, added depth of 0.1 cm  This is part of a series of staged procedures in an attempt at limb salvage.

## 2022-03-25 NOTE — WOUND CARE
03/25/22 1130   Right Leg Edema Point of Measurement   Leg circumference 42 cm   Ankle circumference 25.5 cm   Left Leg Edema Point of Measurement   Leg circumference 41 cm   Ankle circumference 25.5 cm   LLE Peripheral Vascular    Capillary Refill Greater than 3 seconds   Color Appropriate for race   Temperature Warm   Pedal Pulse Palpable   RLE Peripheral Vascular    Capillary Refill Greater than 3 seconds   Color Appropriate for race   Temperature Warm   Pedal Pulse Palpable   Wound Toe (Comment  which one) Left #2 left foot 2nd top tip   Date First Assessed/Time First Assessed: 03/25/22 1131   Present on Hospital Admission: Yes  Location: Toe (Comment  which one)  Wound Location Orientation: Left  Wound Description: #2 left foot 2nd top tip   Wound Image    Wound Length (cm) 3 cm   Wound Width (cm) 3.1 cm   Wound Depth (cm) 0.1 cm   Wound Surface Area (cm^2) 9.3 cm^2   Wound Volume (cm^3) 0.93 cm^3   Wound Assessment Eschar moist;Pink/red;Slough   Drainage Amount Moderate   Drainage Description Serosanguinous   Wound Odor None   Pham-Wound/Incision Assessment Intact   Wound Foot Right;Plantar #1   Date First Assessed/Time First Assessed: 03/04/22 0909   Present on Hospital Admission: Yes  Primary Wound Type: Diabetic Ulcer  Location: Foot  Wound Location Orientation: Right;Plantar  Wound Description: #1   Wound Image    Wound Length (cm) 1.3 cm   Wound Width (cm) 1 cm   Wound Depth (cm) 0.7 cm   Wound Surface Area (cm^2) 1.3 cm^2   Change in Wound Size % -44.44   Wound Volume (cm^3) 0.91 cm^3   Wound Healing % -26   Wound Assessment Eschar moist;Slough   Drainage Amount Moderate   Drainage Description Serosanguinous   Wound Odor None   Pham-Wound/Incision Assessment Hyperkeratosis (Callous)   Edges Flat/open edges   Wound Thickness Description Full thickness   Pain 1   Pain Scale 1 Numeric (0 - 10)   Pain Intensity 1 0     Visit Vitals  /77 (BP 1 Location: Right upper arm, BP Patient Position: Sitting) Pulse 99   Temp 98.1 °F (36.7 °C)   Resp 18

## 2022-03-25 NOTE — WOUND CARE
03/25/22 1140   Wound Toe (Comment  which one) Left #2 left foot 2nd top tip   Date First Assessed/Time First Assessed: 03/25/22 1131   Present on Hospital Admission: Yes  Location: Toe (Comment  which one)  Wound Location Orientation: Left  Wound Description: #2 left foot 2nd top tip   Dressing/Treatment Betadine swabs/Povidone Iodine;Gauze dressing/dressing sponge   Wound Foot Right;Plantar #1   Date First Assessed/Time First Assessed: 03/04/22 0909   Present on Hospital Admission: Yes  Primary Wound Type: Diabetic Ulcer  Location: Foot  Wound Location Orientation: Right;Plantar  Wound Description: #1   Dressing/Treatment Collagen;Alginate with Ag;Gauze dressing/dressing sponge   Post-Procedure Length (cm) 1.3 cm   Post-Procedure Width (cm) 1 cm   Post-Procedure Depth (cm) 0.7 cm   Post-Procedure Surface Area (cm^2) 1.3 cm^2   Post-Procedure Volume (cm^3) 0.91 cm^3   Discharge Condition: Stable     Pain: 3    Ambulatory Status: Walking    Discharge Destination: Home     Transportation: Car    Accompanied by: Self     Discharge instructions reviewed with Patient and copy or written instructions have been provided. All questions/concerns have been addressed at this time.

## 2022-03-25 NOTE — DISCHARGE INSTRUCTIONS
Discharge Instructions for  [facility]  [address] [place], [address] [facility]  Telephone: [phone]    NAME:  Eladia Guillory  YOB: 1980  DATE:  3/4/2022    [] Wound and dressing supply provider: {[facility] provider:[id number]}        Wound Cleansing:   Do not scrub or use excessive force. Cleanse wound prior to applying a clean dressing with:    [] Normal Saline   [] Keep Wound Dry in Shower      [] Wound Cleanser (***)  [] May Shower at Discharge   [x] cleanse with Candace Joann and Candace Joann baby shampoo lather leave 2-3 then rinse with water, pat dry and redress wound. Dressings:           Wound Location right plantar foot    Nadine aquacel ag gauze tape                                                   []     Change dressing:   [] Daily      [x] Every Other Day   [] Three times per week  [] Once a week   [] Do Not Change Dressing     [] Other:    Off-Loading:   [x] Off-loading when [x] walking  [] in bed [] sitting    Dietary:  [x] Diet as tolerated: [] Diabetic Diet:   [x] Increase Protein: examples ( Meat, cheese, eggs, greek yogurt, premier protein drink, fish, nuts )   [] Other:    Return Appointment:  [] Wound assessment with Nurse at wound center in *** days     [] Return Appointment: With Dr. Valeria Webster  1week    [] Ordered tests: {OP Wound Vascular Studies:41501} {OP Wound F/U Imagin}     Electronically signed on 3/4/2022 at 8:08 AJIT Ortiz 8 Information: Should you experience any significant changes in your wound(s) or have questions about your wound care, please contact the [facility] at [address] [place] [address] 8:00 am - 4:30. If you need help with your wound outside these hours and cannot wait until we are again available, contact your PCP or go to the hospital emergency room.      PLEASE NOTE: IF YOU ARE UNABLE TO OBTAIN WOUND SUPPLIES, CONTINUE TO USE THE SUPPLIES YOU HAVE AVAILABLE UNTIL YOU ARE ABLE TO REACH US. IT IS MOST IMPORTANT TO KEEP THE WOUND COVERED AT ALL TIMES.      Physician Signature:_______________________  Dr. Trent Guerrero

## 2022-04-01 ENCOUNTER — HOSPITAL ENCOUNTER (OUTPATIENT)
Dept: WOUND CARE | Age: 42
Discharge: HOME | End: 2022-04-01

## 2022-04-01 NOTE — DISCHARGE INSTRUCTIONS
Discharge Instructions for  [facility]  [address] [place], [address] [facility]  Telephone: [phone]    NAME:  Monique Jacob  YOB: 1980  DATE:  2022    [] Wound and dressing supply provider: {[facility] provider:[id number]}        Wound Cleansing:   Do not scrub or use excessive force. Cleanse wound prior to applying a clean dressing with:    [] Normal Saline   [] Keep Wound Dry in Shower      [] Wound Cleanser (***)  [] May Shower at Discharge   [x] cleanse with Clementine Shaggy and Clementine Shaggy baby shampoo lather leave 2-3 then rinse with water, pat dry and redress wound. Dressings:           Wound Location right plantar foot    Nadine aquacel ag gauze tape change every other day                                                   [x] 2nd toe betadine gauze tape change every day    Off-Loading:   [x] Off-loading when [x] walking  [] in bed [] sitting    Dietary:  [x] Diet as tolerated: [] Diabetic Diet:   [x] Increase Protein: examples ( Meat, cheese, eggs, greek yogurt, premier protein drink, fish, nuts )   [] Other:    Return Appointment:  [] Wound assessment with Nurse at wound center in *** days         [] Ordered tests: {OP Wound Vascular Studies:39079} {OP Wound F/U Imagin}     Electronically signed on 2022 at 8:08 [address]: Should you experience any significant changes in your wound(s) or have questions about your wound care, please contact the [address] [place] [address] at [address] [place] [address] 8:00 am - 4:30. If you need help with your wound outside these hours and cannot wait until we are again available, contact your PCP or go to the hospital emergency room. PLEASE NOTE: IF YOU ARE UNABLE TO OBTAIN WOUND SUPPLIES, CONTINUE TO USE THE SUPPLIES YOU HAVE AVAILABLE UNTIL YOU ARE ABLE TO REACH US. IT IS MOST IMPORTANT TO KEEP THE WOUND COVERED AT ALL TIMES.      Physician Signature:_______________________   Yamile Raza

## 2022-04-01 NOTE — DISCHARGE INSTRUCTIONS
Discharge Instructions for  [facility]  [address] [place], [address] [facility]  Telephone: [phone]    NAME:  Halima Caban  YOB: 1980  DATE:  3/4/2022    [] Wound and dressing supply provider: {PHAN provider:[id number]}        Wound Cleansing:   Do not scrub or use excessive force. Cleanse wound prior to applying a clean dressing with:    [] Normal Saline   [] Keep Wound Dry in Shower      [] Wound Cleanser (***)  [] May Shower at Discharge   [x] cleanse with Kali Flakes and Kali Flakes baby shampoo lather leave 2-3 then rinse with water, pat dry and redress wound. Dressings:           Wound Location right plantar foot    Nadine aquacel ag gauze tape change every other day                                                   [x] 2nd toe betadine gauze tape change every day    Off-Loading:   [x] Off-loading when [x] walking  [] in bed [] sitting    Dietary:  [x] Diet as tolerated: [] Diabetic Diet:   [x] Increase Protein: examples ( Meat, cheese, eggs, greek yogurt, premier protein drink, fish, nuts )   [] Other:    Return Appointment:  [] Wound assessment with Nurse at wound center in *** days         [] Ordered tests: {OP Wound Vascular Studies:59208} {OP Wound F/U Imagin}     Electronically signed on 3/4/2022 at 8:08 AJIT Ortiz 8 Information: Should you experience any significant changes in your wound(s) or have questions about your wound care, please contact the [facility] at [address] [place] [address] 8:00 am - 4:30. If you need help with your wound outside these hours and cannot wait until we are again available, contact your PCP or go to the hospital emergency room. PLEASE NOTE: IF YOU ARE UNABLE TO OBTAIN WOUND SUPPLIES, CONTINUE TO USE THE SUPPLIES YOU HAVE AVAILABLE UNTIL YOU ARE ABLE TO REACH US. IT IS MOST IMPORTANT TO KEEP THE WOUND COVERED AT ALL TIMES.      Physician Signature:_______________________   Myranda Barrera

## 2022-04-20 ENCOUNTER — DOCUMENTATION ONLY (OUTPATIENT)
Dept: WOUND CARE | Age: 42
End: 2022-04-20

## 2022-04-20 NOTE — PROGRESS NOTES
Called and spoke with patient to follow up with him to see if he is needing to reschedule his last missed appointment. Patient states he is currently being seen at the [facility] facility weekly and they are caring for his wound. Due to insurance not paying for his transportation ride to our office he will not be returning and will continue care through [facility].

## 2023-01-11 ENCOUNTER — TRANSCRIBE ORDER (OUTPATIENT)
Dept: SCHEDULING | Age: 43
End: 2023-01-11

## 2023-01-11 ENCOUNTER — HOSPITAL ENCOUNTER (OUTPATIENT)
Dept: INTERVENTIONAL RADIOLOGY/VASCULAR | Age: 43
Discharge: HOME | End: 2023-01-11
Attending: NURSE PRACTITIONER | Admitting: STUDENT IN AN ORGANIZED HEALTH CARE EDUCATION/TRAINING PROGRAM
Payer: COMMERCIAL

## 2023-01-11 VITALS
HEART RATE: 92 BPM | RESPIRATION RATE: 16 BRPM | TEMPERATURE: 98.8 F | DIASTOLIC BLOOD PRESSURE: 86 MMHG | HEIGHT: 70 IN | BODY MASS INDEX: 41.75 KG/M2 | SYSTOLIC BLOOD PRESSURE: 159 MMHG | WEIGHT: 291.6 LBS | OXYGEN SATURATION: 100 %

## 2023-01-11 DIAGNOSIS — T82.41XA: ICD-10-CM

## 2023-01-11 DIAGNOSIS — T82.41XA: Primary | ICD-10-CM

## 2023-01-11 LAB
GLUCOSE BLD STRIP.AUTO-MCNC: 79 MG/DL (ref 65–117)
SERVICE CMNT-IMP: NORMAL

## 2023-01-11 PROCEDURE — 74011000250 HC RX REV CODE- 250: Performed by: NURSE PRACTITIONER

## 2023-01-11 PROCEDURE — C1769 GUIDE WIRE: HCPCS

## 2023-01-11 PROCEDURE — 82962 GLUCOSE BLOOD TEST: CPT

## 2023-01-11 PROCEDURE — C1750 CATH, HEMODIALYSIS,LONG-TERM: HCPCS

## 2023-01-11 PROCEDURE — 2709999900 HC NON-CHARGEABLE SUPPLY

## 2023-01-11 PROCEDURE — 74011250636 HC RX REV CODE- 250/636: Performed by: NURSE PRACTITIONER

## 2023-01-11 PROCEDURE — 77030002986 HC SUT PROL J&J -A

## 2023-01-11 PROCEDURE — 36598 INJ W/FLUOR EVAL CV DEVICE: CPT

## 2023-01-11 PROCEDURE — 36580 REPLACE CVAD CATH: CPT

## 2023-01-11 RX ORDER — FENTANYL CITRATE 50 UG/ML
25-200 INJECTION, SOLUTION INTRAMUSCULAR; INTRAVENOUS
Status: DISCONTINUED | OUTPATIENT
Start: 2023-01-11 | End: 2023-01-11 | Stop reason: HOSPADM

## 2023-01-11 RX ORDER — HEPARIN SODIUM 1000 [USP'U]/ML
1000-5000 INJECTION, SOLUTION INTRAVENOUS; SUBCUTANEOUS ONCE
Status: COMPLETED | OUTPATIENT
Start: 2023-01-11 | End: 2023-01-11

## 2023-01-11 RX ORDER — HYDRALAZINE HYDROCHLORIDE 100 MG/1
100 TABLET, FILM COATED ORAL 3 TIMES DAILY
COMMUNITY

## 2023-01-11 RX ORDER — SEVELAMER CARBONATE 800 MG/1
800 TABLET, FILM COATED ORAL
COMMUNITY

## 2023-01-11 RX ORDER — GABAPENTIN 800 MG/1
800 TABLET ORAL DAILY
COMMUNITY

## 2023-01-11 RX ORDER — BUMETANIDE 1 MG/1
1 TABLET ORAL 2 TIMES DAILY
COMMUNITY

## 2023-01-11 RX ORDER — LIDOCAINE HYDROCHLORIDE 10 MG/ML
5 INJECTION, SOLUTION EPIDURAL; INFILTRATION; INTRACAUDAL; PERINEURAL
Status: DISCONTINUED | OUTPATIENT
Start: 2023-01-11 | End: 2023-01-11 | Stop reason: HOSPADM

## 2023-01-11 RX ADMIN — FENTANYL CITRATE 50 MCG: 50 INJECTION, SOLUTION INTRAMUSCULAR; INTRAVENOUS at 14:45

## 2023-01-11 RX ADMIN — LIDOCAINE HYDROCHLORIDE 15 ML: 10 INJECTION, SOLUTION EPIDURAL; INFILTRATION; INTRACAUDAL; PERINEURAL at 14:33

## 2023-01-11 RX ADMIN — HEPARIN SODIUM 3800 UNITS: 1000 INJECTION INTRAVENOUS; SUBCUTANEOUS at 14:42

## 2023-01-11 RX ADMIN — FENTANYL CITRATE 50 MCG: 50 INJECTION, SOLUTION INTRAMUSCULAR; INTRAVENOUS at 14:31

## 2023-01-11 NOTE — PROGRESS NOTES
TRANSFER - OUT REPORT:    Verbal report given to [facility] RN on [employer] being transferred to [facility] for ordered procedure       Report consisted of patient's Situation, Background, Assessment and   Recommendations(SBAR). Information from the following report(s) Procedure Summary was reviewed with the receiving nurse. Opportunity for questions and clarification was provided.       Patient transported with:   Registered Nurse

## 2023-01-11 NOTE — DISCHARGE INSTRUCTIONS
Central vascular access device: Care instructions  What is a central vascular access device? A CVAD is a thin, flexible tube. It's also called a central line. It is used when you need to receive medicine, fluids, nutrients, or blood products for several weeks or more. The fluids are put through the CVAD so that they move quickly into the bloodstream. The line can be used many times, so you are not poked with a needle every time. A CVAD is put through the skin into a vein, often in the neck, chest, arm, or groin. The point where it leaves the skin is called the exit site. Usually about 12 inches of the line stays outside of the body. But sometimes the CVAD is completely under the skin. The line may have two or three ends so that you can get more than one medicine at a time. These ends are called lumens. The end of each lumen is covered with a cap. Follow-up care is a key part of your treatment and safety. Be sure to make and go to all appointments, and call your doctor if you are having problems. It's also a good idea to know your test results and keep a list of the medicines you take. How can you care for yourself at home? To help prevent infection, take a shower instead of a bath. When you shower, cover the site with waterproof material, such as plastic wrap. Do not go swimming with a CVAD. Keep the dressing clean and dry. If you were given instructions about how to change the dressing at home, follow those instructions carefully. Do not wear jewelry, such as necklaces, that can catch on the line. Talk to your doctor about what activities you can do. You may not be able to do sports or exercises that use the upper body, such as tennis or weight lifting. Clamp or tie off the line if it breaks. Then go see a doctor as soon as possible. If you were taught how to flush the line at home, follow those instructions carefully. When should you call for help?    Call your doctor now or seek immediate medical care if:    You have signs of infection, such as: Increased pain, swelling, warmth, or redness around the line. Red streaks leading from the area around the line. Pus draining from the area around the line. A fever. You have liquid leaking from around the line. There are cracks or leaks in the tube. You have pain or swelling in your neck or arm. The line becomes clogged. Watch closely for changes in your health, and be sure to contact your doctor if you have any problems. Where can you learn more? Go to http://www.gray.com/  Enter P159 in the search box to learn more about \"Central vascular access device: Care instructions. \"  Current as of: January 20, 2022               Content Version: 13.4  © 2006-2022 [employer], [employer]. Care instructions adapted under license by [employer] (which disclaims liability or warranty for this information). If you have questions about a medical condition or this instruction, always ask your healthcare professional. [address] any warranty or liability for your use of this information.

## 2023-01-11 NOTE — H&P
INTERVENTIONAL RADIOLOGY  Preoperative History and Physical      Patient:  Tiago Land  :  1980  Age:  38 y.o. MRN:  [id number]  Today's Date:  2023      CC / HPI   Tiago Land is a 38 y.o. male with a history of malfunctioning Permcath who presents for catheter exchange. 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     Encounter for completion of form with patient 2016    Foot pain, left 2015    High risk sexual behavior 3/14/2012    >1 partner per year    Hypertension     Neuropathy in diabetes ([facility] [address]     Osteomyelitis ([facility] [address]     Teeth problem     Type [facility] mellitus, [facility] 2016     PAST SURGICAL HISTORY  Past Surgical History:   Procedure Laterality Date    HX ORTHOPAEDIC      R foot    HX OTHER SURGICAL      boil drained on buttock     HX OTHER SURGICAL  4/30/15    INGUINAL LYMPH NODE BIOPSY    HX OTHER SURGICAL  4/30/15    Partial  amputation left toes 2 and 3 left foot    HX VASCULAR ACCESS      Fistula im left arm     SOCIAL HISTORY  Social History     Socioeconomic History    Marital status: SINGLE     Spouse name: Not on file    Number of children: Not on file    Years of education: Not on file    Highest education level: Not on file   Occupational History    Not on file   Tobacco Use    Smoking status: Former     Packs/day: 0.25     Years: 10.00     Pack years: 2.50     Types: Cigarettes    Smokeless tobacco: Never    Tobacco comments:     2-3 cigarettes a day   Substance and Sexual Activity    Alcohol use:  Yes     Alcohol/week: 0.0 standard drinks     Comment: once a week    Drug use: No    Sexual activity: Yes     Partners: Female   Other Topics Concern     Service Not Asked    Blood Transfusions Not Asked    Caffeine Concern Not Asked    Occupational Exposure Not Asked    Hobby Hazards Not Asked    Sleep Concern Not Asked    Stress Concern Not Asked    Weight Concern Not Asked    Special Diet Not Asked    Back Care Not Asked    Exercise Not Asked    Bike Helmet Not Asked    Seat Belt Not Asked    Self-Exams Not Asked   Social History Narrative    Not on file     Social Determinants of Health     Financial Resource Strain: Not on file   Food Insecurity: Not on file   Transportation Needs: Not on file   Physical Activity: Not on file   Stress: Not on file   Social Connections: Not on file   Intimate Partner Violence: Not on file   Housing Stability: Not on file     FAMILY HISTORY  Family History   Problem Relation Age of Onset    Hypertension Mother     Hypertension Other     Diabetes Other      CURRENT MEDICATIONS  Current Facility-Administered Medications   Medication Dose Route Frequency Provider Last Rate Last Admin    heparin (porcine) 1,000 unit/mL injection 1,000-5,000 Units  1,000-5,000 Units IntraCATHeter ONCE Gerosn Novel., NP        lidocaine (XYLOCAINE) 10 mg/mL (1 %) injection 5 mL  5 mL SubCUTAneous Multiple Gerson Novel., NP        fentaNYL citrate (PF) injection  mcg   mcg IntraVENous Multiple Gerson Novel., NP         ALLERGIES  No Known Allergies      DIAGNOSTIC STUDIES   IMAGING STUDIES  No prior imaging studies available    LABS  Lab Results   Component Value Date/Time    WBC 9.9 01/21/2020 12:18 AM    HGB 12.9 01/21/2020 12:18 AM    HCT 39.4 01/21/2020 12:18 AM    PLATELET 772 70/65/4472 12:18 AM    MCV 82.6 01/21/2020 12:18 AM     Lab Results   Component Value Date/Time    Sodium 141 01/21/2020 12:18 AM    Potassium 3.8 01/21/2020 12:18 AM    Chloride 111 (H) 01/21/2020 12:18 AM    CO2 28 01/21/2020 12:18 AM    Anion gap 2 (L) 01/21/2020 12:18 AM    Glucose 91 01/21/2020 12:18 AM    BUN 18 01/21/2020 12:18 AM    Creatinine 1.53 (H) 01/21/2020 12:18 AM    BUN/Creatinine ratio 12 01/21/2020 12:18 AM    GFR est AA >60 01/21/2020 12:18 AM    GFR est non-AA 50 (L) 01/21/2020 12:18 AM    Calcium 8.3 (L) 01/21/2020 12:18 AM Lab Results   Component Value Date/Time    INR 1.3 (H) 10/29/2014 02:40 PM    Prothrombin time 13.6 (H) 10/29/2014 02:40 PM       PHYSICAL EXAM   BP (!) 159/86 (BP 1 Location: Left lower arm, BP Patient Position: At rest)   Pulse 92   Temp 98.8 °F (37.1 °C)   Resp 16   Ht 5' 10\" (1.778 m)   Wt 132.3 kg (291 lb 9.6 oz)   SpO2 100%   BMI 41.84 kg/m²   General:  NAD  Heart:  RRR  Lungs:  NWOB  Neurological:  AAOX3      PLAN   Procedure to be performed:  Permcath exchange  Plan for sedation:  local + intravenous Fentanyl for analgesia  Post procedure plan:  observation per protocol  Informed consent:  risks, benefits, and alternatives reviewed with the patient / family who agree to proceed  Code status:  full code      Valentine Fleming, GEOVANY  [facility].

## 2023-01-11 NOTE — ROUTINE PROCESS
Received patient from waiting area. Armband and allergies verbally confirmed with patient. Procedure explained and consents signed. 1408: TRANSFER - OUT REPORT:    Verbal report given to [facility] RN(name) on [facility]  being transferred to angio lab(unit) for ordered procedure       Report consisted of patients Situation, Background, Assessment and   Recommendations(SBAR). Information from the following report(s) SBAR was reviewed with the receiving nurse. Lines:   Peripheral IV 01/11/23 Anterior; Left Forearm (Active)   Site Assessment Clean, dry, & intact 01/11/23 1343   Phlebitis Assessment 0 01/11/23 1343   Infiltration Assessment 0 01/11/23 1343   Dressing Status Clean, dry, & intact 01/11/23 1343   Dressing Type Transparent 01/11/23 1343   Hub Color/Line Status Blue;Flushed; Infusing 01/11/23 1343        Opportunity for questions and clarification was provided. Patient transported with:   Registered Nurse      1450: TRANSFER - IN REPORT:    Verbal report received from (name) on [facility]  being received from angio lab(unit) for routine post - op      Report consisted of patients Situation, Background, Assessment and   Recommendations(SBAR). Information from the following report(s) Procedure Summary was reviewed with the receiving nurse. Opportunity for questions and clarification was provided. Assessment completed upon patients arrival to unit and care assumed. 1457: Patient received from angio lab. Patient with dialysis catheter to right upper chest with island dressing. Site clean, dry and intact. No hematoma. Patient with no complaints at this time. Assisted with snack. 1510: Discharge instructions and prescriptions reviewed with patient. Opportunity provided for questions. Patient verbalized understanding. Signed copy of discharge placed in the front of patient's chart. 1513: Patient dangled on the side of the bed. Site CDI. No complaints    1515: IV removed. 1525: Patient ambulated to waiting area. Gait steady. Site CDI.   No complaints

## 2023-01-11 NOTE — PROGRESS NOTES
TRANSFER - IN REPORT:    Verbal report received from [facility] RN on [employer]  being received from [facility] for ordered procedure      Report consisted of patients Situation, Background, Assessment and   Recommendations(SBAR). Information from the following report(s) SBAR was reviewed with the receiving nurse. Opportunity for questions and clarification was provided. Assessment completed upon patients arrival to unit and care assumed.

## 2023-10-06 RX ORDER — ATORVASTATIN CALCIUM 10 MG/1
10 TABLET, FILM COATED ORAL DAILY
COMMUNITY

## 2023-10-06 NOTE — PROGRESS NOTES
Reviewed 07/2023 EKG from [facility] for planned procedure with Dr. Juan J Liu. No new orders.

## 2023-10-10 ENCOUNTER — ANESTHESIA EVENT (OUTPATIENT)
Facility: HOSPITAL | Age: 43
End: 2023-10-10
Payer: COMMERCIAL

## 2023-10-10 ASSESSMENT — LIFESTYLE VARIABLES: SMOKING_STATUS: 1

## 2023-10-11 ENCOUNTER — ANESTHESIA (OUTPATIENT)
Facility: HOSPITAL | Age: 43
End: 2023-10-11
Payer: COMMERCIAL

## 2023-10-11 ENCOUNTER — HOSPITAL ENCOUNTER (OUTPATIENT)
Facility: HOSPITAL | Age: 43
Setting detail: OUTPATIENT SURGERY
Discharge: HOME | End: 2023-10-11
Attending: SURGERY | Admitting: SURGERY
Payer: COMMERCIAL

## 2023-10-11 VITALS
RESPIRATION RATE: 15 BRPM | TEMPERATURE: 97.5 F | HEIGHT: 70 IN | OXYGEN SATURATION: 97 % | DIASTOLIC BLOOD PRESSURE: 86 MMHG | SYSTOLIC BLOOD PRESSURE: 127 MMHG | WEIGHT: 282.41 LBS | HEART RATE: 80 BPM | BODY MASS INDEX: 40.43 KG/M2

## 2023-10-11 DIAGNOSIS — G89.18 POST-OP PAIN: Primary | ICD-10-CM

## 2023-10-11 LAB
ANION GAP BLD CALC-SCNC: 10 (ref 10–20)
BASE EXCESS BLD CALC-SCNC: 0.5 MMOL/L
CA-I BLD-MCNC: 1.13 MMOL/L (ref 1.12–1.32)
CHLORIDE BLD-SCNC: 100 MMOL/L (ref 100–108)
CO2 BLD-SCNC: 26 MMOL/L (ref 19–24)
CREAT UR-MCNC: 10.9 MG/DL (ref 0.6–1.3)
ERYTHROCYTE [DISTWIDTH] IN BLOOD BY AUTOMATED COUNT: 15.9 % (ref 11.5–14.5)
GLUCOSE BLD STRIP.AUTO-MCNC: 108 MG/DL (ref 65–117)
GLUCOSE BLD STRIP.AUTO-MCNC: 128 MG/DL (ref 74–106)
HCO3 BLDA-SCNC: 26 MMOL/L
HCT VFR BLD AUTO: 42.4 % (ref 36.6–50.3)
HGB BLD-MCNC: 14.1 G/DL (ref 12.1–17)
LACTATE BLD-SCNC: 1.01 MMOL/L (ref 0.4–2)
MCH RBC QN AUTO: 29.5 PG (ref 26–34)
MCHC RBC AUTO-ENTMCNC: 33.3 G/DL (ref 30–36.5)
MCV RBC AUTO: 88.7 FL (ref 80–99)
NRBC # BLD: 0 K/UL (ref 0–0.01)
NRBC BLD-RTO: 0 PER 100 WBC
PCO2 BLDV: 44.5 MMHG (ref 41–51)
PH BLDV: 7.38 (ref 7.32–7.42)
PLATELET # BLD AUTO: 264 K/UL (ref 150–400)
PMV BLD AUTO: 10.3 FL (ref 8.9–12.9)
PO2 BLDV: 35 MMHG (ref 25–40)
POTASSIUM BLD-SCNC: 3.7 MMOL/L (ref 3.5–5.5)
RBC # BLD AUTO: 4.78 M/UL (ref 4.1–5.7)
SAO2 % BLD: 66 %
SERVICE CMNT-IMP: NORMAL
SODIUM BLD-SCNC: 136 MMOL/L (ref 136–145)
SPECIMEN SITE: ABNORMAL
WBC # BLD AUTO: 11.4 K/UL (ref 4.1–11.1)

## 2023-10-11 PROCEDURE — 82962 GLUCOSE BLOOD TEST: CPT

## 2023-10-11 PROCEDURE — 82803 BLOOD GASES ANY COMBINATION: CPT

## 2023-10-11 PROCEDURE — A4217 STERILE WATER/SALINE, 500 ML: HCPCS | Performed by: SURGERY

## 2023-10-11 PROCEDURE — 82947 ASSAY GLUCOSE BLOOD QUANT: CPT

## 2023-10-11 PROCEDURE — 3600000012 HC SURGERY LEVEL 2 ADDTL 15MIN: Performed by: SURGERY

## 2023-10-11 PROCEDURE — 7100000000 HC PACU RECOVERY - FIRST 15 MIN: Performed by: SURGERY

## 2023-10-11 PROCEDURE — C1768 GRAFT, VASCULAR: HCPCS | Performed by: SURGERY

## 2023-10-11 PROCEDURE — 3600000002 HC SURGERY LEVEL 2 BASE: Performed by: SURGERY

## 2023-10-11 PROCEDURE — 6360000002 HC RX W HCPCS: Performed by: ANESTHESIOLOGY

## 2023-10-11 PROCEDURE — 2580000003 HC RX 258: Performed by: ANESTHESIOLOGY

## 2023-10-11 PROCEDURE — 82330 ASSAY OF CALCIUM: CPT

## 2023-10-11 PROCEDURE — 7100000011 HC PHASE II RECOVERY - ADDTL 15 MIN: Performed by: SURGERY

## 2023-10-11 PROCEDURE — 7100000001 HC PACU RECOVERY - ADDTL 15 MIN: Performed by: SURGERY

## 2023-10-11 PROCEDURE — 6360000002 HC RX W HCPCS: Performed by: SURGERY

## 2023-10-11 PROCEDURE — 2709999900 HC NON-CHARGEABLE SUPPLY: Performed by: SURGERY

## 2023-10-11 PROCEDURE — 64415 NJX AA&/STRD BRCH PLXS IMG: CPT | Performed by: ANESTHESIOLOGY

## 2023-10-11 PROCEDURE — 2580000003 HC RX 258: Performed by: SURGERY

## 2023-10-11 PROCEDURE — 2720000010 HC SURG SUPPLY STERILE: Performed by: SURGERY

## 2023-10-11 PROCEDURE — 84132 ASSAY OF SERUM POTASSIUM: CPT

## 2023-10-11 PROCEDURE — 36415 COLL VENOUS BLD VENIPUNCTURE: CPT

## 2023-10-11 PROCEDURE — 6360000002 HC RX W HCPCS: Performed by: NURSE ANESTHETIST, CERTIFIED REGISTERED

## 2023-10-11 PROCEDURE — 3700000001 HC ADD 15 MINUTES (ANESTHESIA): Performed by: SURGERY

## 2023-10-11 PROCEDURE — 3700000000 HC ANESTHESIA ATTENDED CARE: Performed by: SURGERY

## 2023-10-11 PROCEDURE — 85027 COMPLETE CBC AUTOMATED: CPT

## 2023-10-11 PROCEDURE — 6370000000 HC RX 637 (ALT 250 FOR IP): Performed by: SURGERY

## 2023-10-11 PROCEDURE — 84295 ASSAY OF SERUM SODIUM: CPT

## 2023-10-11 PROCEDURE — 2580000003 HC RX 258: Performed by: STUDENT IN AN ORGANIZED HEALTH CARE EDUCATION/TRAINING PROGRAM

## 2023-10-11 PROCEDURE — 7100000010 HC PHASE II RECOVERY - FIRST 15 MIN: Performed by: SURGERY

## 2023-10-11 DEVICE — PROPATEN VASCULAR GRAFT SW 4-7MMX80CM TAPERED HEPARIN
Type: IMPLANTABLE DEVICE | Site: ARM | Status: FUNCTIONAL
Brand: GORE PROPATEN VASCULAR GRAFT

## 2023-10-11 RX ORDER — FENTANYL CITRATE 50 UG/ML
50 INJECTION, SOLUTION INTRAMUSCULAR; INTRAVENOUS EVERY 5 MIN PRN
Status: DISCONTINUED | OUTPATIENT
Start: 2023-10-11 | End: 2023-10-11 | Stop reason: HOSPADM

## 2023-10-11 RX ORDER — HEPARIN SODIUM 1000 [USP'U]/ML
2000 INJECTION, SOLUTION INTRAVENOUS; SUBCUTANEOUS ONCE
Status: COMPLETED | OUTPATIENT
Start: 2023-10-11 | End: 2023-10-11

## 2023-10-11 RX ORDER — PROCHLORPERAZINE EDISYLATE 5 MG/ML
5 INJECTION INTRAMUSCULAR; INTRAVENOUS
Status: DISCONTINUED | OUTPATIENT
Start: 2023-10-11 | End: 2023-10-11 | Stop reason: HOSPADM

## 2023-10-11 RX ORDER — ROPIVACAINE HYDROCHLORIDE 5 MG/ML
INJECTION, SOLUTION EPIDURAL; INFILTRATION; PERINEURAL PRN
Status: DISCONTINUED | OUTPATIENT
Start: 2023-10-11 | End: 2023-10-11 | Stop reason: SDUPTHER

## 2023-10-11 RX ORDER — SODIUM CHLORIDE 0.9 % (FLUSH) 0.9 %
5-40 SYRINGE (ML) INJECTION PRN
Status: DISCONTINUED | OUTPATIENT
Start: 2023-10-11 | End: 2023-10-11 | Stop reason: HOSPADM

## 2023-10-11 RX ORDER — PROPOFOL 10 MG/ML
INJECTION, EMULSION INTRAVENOUS CONTINUOUS PRN
Status: DISCONTINUED | OUTPATIENT
Start: 2023-10-11 | End: 2023-10-11 | Stop reason: SDUPTHER

## 2023-10-11 RX ORDER — HYDROMORPHONE HYDROCHLORIDE 1 MG/ML
0.25 INJECTION, SOLUTION INTRAMUSCULAR; INTRAVENOUS; SUBCUTANEOUS EVERY 5 MIN PRN
Status: DISCONTINUED | OUTPATIENT
Start: 2023-10-11 | End: 2023-10-11 | Stop reason: HOSPADM

## 2023-10-11 RX ORDER — SODIUM CHLORIDE 9 MG/ML
INJECTION, SOLUTION INTRAVENOUS PRN
Status: DISCONTINUED | OUTPATIENT
Start: 2023-10-11 | End: 2023-10-11 | Stop reason: HOSPADM

## 2023-10-11 RX ORDER — FENTANYL CITRATE 50 UG/ML
INJECTION, SOLUTION INTRAMUSCULAR; INTRAVENOUS PRN
Status: DISCONTINUED | OUTPATIENT
Start: 2023-10-11 | End: 2023-10-11 | Stop reason: SDUPTHER

## 2023-10-11 RX ORDER — MIDAZOLAM HYDROCHLORIDE 1 MG/ML
INJECTION INTRAMUSCULAR; INTRAVENOUS PRN
Status: DISCONTINUED | OUTPATIENT
Start: 2023-10-11 | End: 2023-10-11 | Stop reason: SDUPTHER

## 2023-10-11 RX ORDER — ONDANSETRON 2 MG/ML
INJECTION INTRAMUSCULAR; INTRAVENOUS PRN
Status: DISCONTINUED | OUTPATIENT
Start: 2023-10-11 | End: 2023-10-11 | Stop reason: SDUPTHER

## 2023-10-11 RX ORDER — SODIUM CHLORIDE 0.9 % (FLUSH) 0.9 %
5-40 SYRINGE (ML) INJECTION EVERY 12 HOURS SCHEDULED
Status: DISCONTINUED | OUTPATIENT
Start: 2023-10-11 | End: 2023-10-11 | Stop reason: HOSPADM

## 2023-10-11 RX ORDER — HEPARIN SODIUM 1000 [USP'U]/ML
INJECTION, SOLUTION INTRAVENOUS; SUBCUTANEOUS PRN
Status: DISCONTINUED | OUTPATIENT
Start: 2023-10-11 | End: 2023-10-11 | Stop reason: SDUPTHER

## 2023-10-11 RX ORDER — TRAMADOL HYDROCHLORIDE 50 MG/1
50 TABLET ORAL EVERY 6 HOURS PRN
Qty: 12 TABLET | Refills: 0 | Status: SHIPPED | OUTPATIENT
Start: 2023-10-11 | End: 2023-10-14

## 2023-10-11 RX ORDER — SODIUM CHLORIDE 9 MG/ML
INJECTION, SOLUTION INTRAVENOUS ONCE
Status: COMPLETED | OUTPATIENT
Start: 2023-10-11 | End: 2023-10-11

## 2023-10-11 RX ADMIN — VANCOMYCIN HYDROCHLORIDE 1500 MG: 1.5 INJECTION, POWDER, LYOPHILIZED, FOR SOLUTION INTRAVENOUS at 07:20

## 2023-10-11 RX ADMIN — HEPARIN SODIUM 7000 UNITS: 1000 INJECTION, SOLUTION INTRAVENOUS; SUBCUTANEOUS at 08:49

## 2023-10-11 RX ADMIN — PROPOFOL 50 MCG/KG/MIN: 10 INJECTION, EMULSION INTRAVENOUS at 08:14

## 2023-10-11 RX ADMIN — Medication 3000 MG: at 08:29

## 2023-10-11 RX ADMIN — Medication 3 AMPULE: at 07:19

## 2023-10-11 RX ADMIN — SODIUM CHLORIDE 25 ML: 9 INJECTION, SOLUTION INTRAVENOUS at 07:22

## 2023-10-11 RX ADMIN — HEPARIN SODIUM 2000 UNITS: 1000 INJECTION INTRAVENOUS; SUBCUTANEOUS at 11:30

## 2023-10-11 RX ADMIN — MIDAZOLAM HYDROCHLORIDE 2 MG: 1 INJECTION, SOLUTION INTRAMUSCULAR; INTRAVENOUS at 07:38

## 2023-10-11 RX ADMIN — FENTANYL CITRATE 100 MCG: 50 INJECTION, SOLUTION INTRAMUSCULAR; INTRAVENOUS at 07:38

## 2023-10-11 RX ADMIN — ONDANSETRON 4 MG: 2 INJECTION INTRAMUSCULAR; INTRAVENOUS at 09:37

## 2023-10-11 RX ADMIN — ROPIVACAINE HYDROCHLORIDE 10 ML: 5 INJECTION, SOLUTION EPIDURAL; INFILTRATION; PERINEURAL at 07:51

## 2023-10-11 RX ADMIN — SODIUM CHLORIDE: 9 INJECTION, SOLUTION INTRAVENOUS at 08:01

## 2023-10-11 RX ADMIN — ROPIVACAINE HYDROCHLORIDE 30 ML: 5 INJECTION, SOLUTION EPIDURAL; INFILTRATION; PERINEURAL at 07:49

## 2023-10-11 ASSESSMENT — PAIN - FUNCTIONAL ASSESSMENT: PAIN_FUNCTIONAL_ASSESSMENT: 0-10

## 2023-10-11 NOTE — ANESTHESIA PROCEDURE NOTES
Peripheral Block    Patient location during procedure: procedure area  Reason for block: post-op pain management  Start time: 10/11/2023 7:38 AM  End time: 10/11/2023 7:51 AM  Staffing  Performed: anesthesiologist   Anesthesiologist: Yasmani Hennessy MD  Performed by: Yasmani Hennessy MD  Authorized by: Yasmani Hennessy MD    Preanesthetic Checklist  Completed: patient identified, IV checked, site marked, risks and benefits discussed, surgical/procedural consents, equipment checked, pre-op evaluation, timeout performed, anesthesia consent given, oxygen available, monitors applied/VS acknowledged, fire risk safety assessment completed and verbalized and blood product R/B/A discussed and consented  Peripheral Block   Patient position: supine  Prep: ChloraPrep  Provider prep: mask, sterile gloves and sterile gown  Patient monitoring: cardiac monitor, continuous pulse ox, frequent blood pressure checks, IV access, oxygen and responsive to questions  Block type: Brachial plexus  Supraclavicular  Laterality: right  Injection technique: single-shot  Guidance: ultrasound guided    Needle   Needle type: insulated echogenic nerve stimulator needle   Needle gauge: 22 G  Needle localization: anatomical landmarks and ultrasound guidance  Assessment   Injection assessment: negative aspiration for heme, no paresthesia on injection, local visualized surrounding nerve on ultrasound and no intravascular symptoms  Paresthesia pain: none  Slow fractionated injection: yes  Hemodynamics: stable  Real-time US image taken/store: yes  Outcomes: uncomplicated and patient tolerated procedure well    Additional Notes  Supraclavicular Nerve Block Note     Right Supraclavicular nerve block:    Risks, benefits, alternatives explained at length and patient agrees to proceed. Time out performed and site for block/surgery identified.   Standard monitors applied, 3 L NC O2, and sedation given as recorded by RN so as to achieve patient

## 2023-10-11 NOTE — PERIOP NOTE
Addendum - No   covid  test   no s/s   took   vaccine. Mepilex sacrum border preventatively applied    skin intact. Cbc   and   epoc   chem  done   as ordered   wnl. Aseptic   technique   utiolized   to  permacath  to    draw   blood   and  iv   hook up  with  discard ot  10cc   blood   initially. Infusing  without   problems   . Block  done  to    right     scb   with out   problems   with pt  on nasal 2 liters and monitor  on  sr on the scope. Versed  2cc and     fentanyl 100mcg   given  by   dr. Isiah Holter. No   bruit   or    thrill to   right    arm    graft.

## 2023-10-11 NOTE — ANESTHESIA POSTPROCEDURE EVALUATION
Department of Anesthesiology  Postprocedure Note    Patient: Nehemias Goldstein  MRN: [id number]  YOB: 1980  Date of evaluation: 10/11/2023      Procedure Summary     Date: 10/11/23 Room / Location: [facility] MAIN OR M2 / [facility] MAIN OR    Anesthesia Start: 0810 Anesthesia Stop: 6628    Procedure: REVISION RIGHT ARM ARTERIORVENOUS GRAFT (AXILLARY BLOCK) (Right: Arm Upper) Diagnosis:       End stage renal disease (HCC)      (End stage renal disease ([address]) [N18.6])    Providers: Nadeen Franklin MD Responsible Provider: Devon Claudio MD    Anesthesia Type: MAC, Regional ASA Status: 4          Anesthesia Type: MAC, Regional    Hortensia Phase I: Hortensia Score: 9    Hortensia Phase II:        Anesthesia Post Evaluation    Patient location during evaluation: PACU  Patient participation: complete - patient participated  Level of consciousness: sleepy but conscious and responsive to verbal stimuli  Airway patency: patent  Nausea & Vomiting: no vomiting and no nausea  Complications: no  Cardiovascular status: blood pressure returned to baseline and hemodynamically stable  Respiratory status: acceptable  Hydration status: stable

## 2023-10-11 NOTE — OP NOTE
Operative Note      Patient: Mychal Perales  YOB: 1980  MRN: [id number]    Date of Procedure: 10/11/2023    Pre-Op Diagnosis: ESRSD with ischemia steal R hand    Post-Op Diagnosis: Same       Proc: Revision of R arm AV graft with proximalization of inflow     Surgeon(s):  Elmer Skiff, MD    Anesthesia: Regional    Estimated Blood Loss (mL): Minimal    Complications: None    Implants:  Implant Name Type Inv.  Item Serial No.  Lot No. LRB No. Used Action   GRAFT VASC L80CM ID4 7MM PTFE STD WALLED TAPR NONRINGED - [id number] Vascular grafts GRAFT VASC L80CM ID4 7MM PTFE STD WALLED TAPR NONRINGED [id number]  [employer]  Right 1 Implanted         Findings: 2+ R radial pulse in PACU and 100% pulse ox reading R index finger        lectronically signed by Charles Suazo MD on 10/11/2023 at 9:50 AM

## 2023-10-16 NOTE — OP NOTE
[facility]  OPERATIVE REPORT    Name:  Nehemias Goldstein  MR#:  [id number]  :  1980  ACCOUNT #:  [de-identified]  DATE OF SERVICE:  10/11/2023    PREOPERATIVE DIAGNOSIS:  End-stage renal disease with a ischemic steal of the right hand. POSTOPERATIVE DIAGNOSIS:  End-stage renal disease with a ischemic steal of the right hand. PROCEDURE PERFORMED:  Revision of right arm AV graft with proximalization of the inflow to the axillary artery. SURGEON:  Nadeen Franklin MD    ANESTHESIA:  Regional block. COMPLICATIONS:  None. ESTIMATED BLOOD LOSS:  Minimal.    INDICATIONS:  The patient is a 47-year-old gentleman with a right arm straight graft placed at [facility] with which he has experienced significant steal.  This is the largest steal that is documented on noninvasive testing. He was admitted for proximalization to the axillary artery. Given that he has a 7 mm straight graft in place, we will proximalize it with a tapered graft as well. PROCEDURE:  After obtaining informed consent, the patient was placed supine on the operating table. After adequate induction of regional block anesthesia, site and patient confirmation, administration of prophylactic antibiotics, the right arm was prepped and draped in the usual sterile fashion. The previous axillary incision was reentered and deepened until the axillary artery was dissected free and controlled. The patient was systemically heparinized and the 4 mm end of the tapered PTFE graft was anastomosed end-to-side to the axillary artery using continuous Moselle-Kelechi suture technique. A second incision was made over the existing graft which was divided and oversewn proximally. The new graft was tunneled to the venous end of the existing graft and anastomosed end-to-end using a continuous Moselle-Kelechi suture technique. At the completion of the anastomosis, all suture lines were hemostatic.   There was a nice thrill over the reconstructed graft and a palpable radial pulse at the wrist.  Both wounds were copiously irrigated with antibiotic solution and closed in 2 layers. The patient tolerated the procedure well with no complications.       Simi Ken MD      [id number]  D:  10/15/2023 17:58  T:  10/15/2023 23:38  JOB #:  [id number]  CC:  Simi Ken MD

## 2024-05-15 ENCOUNTER — HOSPITAL ENCOUNTER (OUTPATIENT)
Facility: HOSPITAL | Age: 44
Discharge: HOME | End: 2024-05-18
Payer: COMMERCIAL

## 2024-05-15 VITALS
HEART RATE: 91 BPM | BODY MASS INDEX: 40.8 KG/M2 | HEIGHT: 70 IN | RESPIRATION RATE: 14 BRPM | TEMPERATURE: 97.9 F | SYSTOLIC BLOOD PRESSURE: 133 MMHG | DIASTOLIC BLOOD PRESSURE: 85 MMHG | WEIGHT: 285 LBS

## 2024-05-15 LAB
ABO + RH BLD: NORMAL
ALBUMIN SERPL-MCNC: 3.4 G/DL (ref 3.5–5)
ALBUMIN/GLOB SERPL: 0.9 (ref 1.1–2.2)
ALP SERPL-CCNC: 70 U/L (ref 45–117)
ALT SERPL-CCNC: 19 U/L (ref 12–78)
ANION GAP SERPL CALC-SCNC: 6 MMOL/L (ref 5–15)
APTT PPP: 32.8 SEC (ref 22.1–31)
AST SERPL-CCNC: 14 U/L (ref 15–37)
BASOPHILS # BLD: 0.1 K/UL (ref 0–0.1)
BASOPHILS NFR BLD: 1 % (ref 0–1)
BILIRUB SERPL-MCNC: 0.7 MG/DL (ref 0.2–1)
BLOOD GROUP ANTIBODIES SERPL: NORMAL
BUN SERPL-MCNC: 49 MG/DL (ref 6–20)
BUN/CREAT SERPL: 4 (ref 12–20)
CALCIUM SERPL-MCNC: 9.7 MG/DL (ref 8.5–10.1)
CHLORIDE SERPL-SCNC: 102 MMOL/L (ref 97–108)
CO2 SERPL-SCNC: 28 MMOL/L (ref 21–32)
CREAT SERPL-MCNC: 11.1 MG/DL (ref 0.7–1.3)
DIFFERENTIAL METHOD BLD: NORMAL
EKG ATRIAL RATE: 81 BPM
EKG DIAGNOSIS: NORMAL
EKG P AXIS: 58 DEGREES
EKG P-R INTERVAL: 150 MS
EKG Q-T INTERVAL: 374 MS
EKG QRS DURATION: 84 MS
EKG QTC CALCULATION (BAZETT): 434 MS
EKG R AXIS: -2 DEGREES
EKG T AXIS: 33 DEGREES
EKG VENTRICULAR RATE: 81 BPM
EOSINOPHIL # BLD: 0.3 K/UL (ref 0–0.4)
EOSINOPHIL NFR BLD: 4 % (ref 0–7)
ERYTHROCYTE [DISTWIDTH] IN BLOOD BY AUTOMATED COUNT: 14.3 % (ref 11.5–14.5)
EST. AVERAGE GLUCOSE BLD GHB EST-MCNC: 203 MG/DL
GLOBULIN SER CALC-MCNC: 3.7 G/DL (ref 2–4)
GLUCOSE SERPL-MCNC: 113 MG/DL (ref 65–100)
HBA1C MFR BLD: 8.7 % (ref 4–5.6)
HCT VFR BLD AUTO: 37.8 % (ref 36.6–50.3)
HGB BLD-MCNC: 12.9 G/DL (ref 12.1–17)
IMM GRANULOCYTES # BLD AUTO: 0 K/UL (ref 0–0.04)
IMM GRANULOCYTES NFR BLD AUTO: 0 % (ref 0–0.5)
INR PPP: 1 (ref 0.9–1.1)
LYMPHOCYTES # BLD: 2.4 K/UL (ref 0.8–3.5)
LYMPHOCYTES NFR BLD: 34 % (ref 12–49)
MCH RBC QN AUTO: 29.5 PG (ref 26–34)
MCHC RBC AUTO-ENTMCNC: 34.1 G/DL (ref 30–36.5)
MCV RBC AUTO: 86.3 FL (ref 80–99)
MONOCYTES # BLD: 0.8 K/UL (ref 0–1)
MONOCYTES NFR BLD: 12 % (ref 5–13)
NEUTS SEG # BLD: 3.6 K/UL (ref 1.8–8)
NEUTS SEG NFR BLD: 49 % (ref 32–75)
NRBC # BLD: 0 K/UL (ref 0–0.01)
NRBC BLD-RTO: 0 PER 100 WBC
PLATELET # BLD AUTO: 255 K/UL (ref 150–400)
PMV BLD AUTO: 10.4 FL (ref 8.9–12.9)
POTASSIUM SERPL-SCNC: 4.5 MMOL/L (ref 3.5–5.1)
PROT SERPL-MCNC: 7.1 G/DL (ref 6.4–8.2)
PROTHROMBIN TIME: 10.5 SEC (ref 9–11.1)
RBC # BLD AUTO: 4.38 M/UL (ref 4.1–5.7)
SODIUM SERPL-SCNC: 136 MMOL/L (ref 136–145)
SPECIMEN EXP DATE BLD: NORMAL
THERAPEUTIC RANGE: ABNORMAL SECS (ref 58–77)
WBC # BLD AUTO: 7.2 K/UL (ref 4.1–11.1)

## 2024-05-15 PROCEDURE — 86850 RBC ANTIBODY SCREEN: CPT

## 2024-05-15 PROCEDURE — 83036 HEMOGLOBIN GLYCOSYLATED A1C: CPT

## 2024-05-15 PROCEDURE — 85025 COMPLETE CBC W/AUTO DIFF WBC: CPT

## 2024-05-15 PROCEDURE — 85730 THROMBOPLASTIN TIME PARTIAL: CPT

## 2024-05-15 PROCEDURE — 93005 ELECTROCARDIOGRAM TRACING: CPT | Performed by: SURGERY

## 2024-05-15 PROCEDURE — 80053 COMPREHEN METABOLIC PANEL: CPT

## 2024-05-15 PROCEDURE — 36415 COLL VENOUS BLD VENIPUNCTURE: CPT

## 2024-05-15 PROCEDURE — 86901 BLOOD TYPING SEROLOGIC RH(D): CPT

## 2024-05-15 PROCEDURE — 86900 BLOOD TYPING SEROLOGIC ABO: CPT

## 2024-05-15 PROCEDURE — 85610 PROTHROMBIN TIME: CPT

## 2024-05-15 RX ORDER — MIDODRINE HYDROCHLORIDE 5 MG/1
5 TABLET ORAL DAILY
COMMUNITY

## 2024-05-15 ASSESSMENT — PAIN SCALES - GENERAL: PAINLEVEL_OUTOF10: 5

## 2024-05-15 ASSESSMENT — PAIN DESCRIPTION - PAIN TYPE: TYPE: CHRONIC PAIN

## 2024-05-15 ASSESSMENT — PAIN DESCRIPTION - DESCRIPTORS: DESCRIPTORS: ACHING

## 2024-05-15 ASSESSMENT — PAIN DESCRIPTION - LOCATION: LOCATION: LEG

## 2024-05-15 ASSESSMENT — PAIN DESCRIPTION - FREQUENCY: FREQUENCY: CONTINUOUS

## 2024-05-15 ASSESSMENT — PAIN DESCRIPTION - ORIENTATION: ORIENTATION: RIGHT

## 2024-05-15 ASSESSMENT — PAIN - FUNCTIONAL ASSESSMENT: PAIN_FUNCTIONAL_ASSESSMENT: PREVENTS OR INTERFERES SOME ACTIVE ACTIVITIES AND ADLS

## 2024-05-15 NOTE — PERIOP NOTE
[address] [address]   MAIN OR                                  [phone]    MAIN PRE OP             [phone]                                                                                AMBULATORY PRE OP          [phone]  PRE-ADMISSION TESTING    [phone]     Surgery Date:  5/22/24       Is surgery arrival time given by surgeon?  YES  NO    If “NO”, [facility] staff will call you between 4 and 7pm the day before your surgery with your arrival time. (If your surgery is on a Monday, we will call you the Friday before.)    Call [phone] after 7pm Monday-Friday if you did not receive this call.    INSTRUCTIONS BEFORE YOUR SURGERY   When You  Arrive Arrive at [facility] Patient Access on 1st floor the day of your surgery.  Have your insurance card, photo ID, living will/advanced directive/POA (if applicable),  and any copayment (if needed)   Food   and   Drink NO food or drink after midnight the night before surgery    This means NO water, gum, mints, coffee, juice, etc.  No alcohol (beer, wine, liquor) or marijuana (smoking) 24 hours prior to surgery, or Marijuana edibles for 3 days prior to surgery.  Stop smoking cigarettes 14 days before surgery (helps w/healing and breathing).   Medications to   TAKE   Morning of Surgery MEDICATIONS TO TAKE THE MORNING OF SURGERY WITH A SIP OF WATER:   GABAPENTIN  ATORVASTATIN  HYDRALAZINE    You may take these medications, IF NEEDED, the morning of surgery: N/A  Ask your surgeon/prescribing doctor for instructions on taking or stopping these medications prior to surgery: NOVALOG INSULIN   Medications to STOP  before surgery Non-Steroidal anti-inflammatory Drugs (NSAID's): for example, Diclofenac (Voltaren), Ibuprofen (Advil, Motrin), Naproxen (Aleve) 5 days  STOP all herbal supplements and vitamins(unless prescribed by your doctor), and fish oil for 7 days  Other: STOP ELIQUIS ON 5/20/24

## 2024-05-16 NOTE — PERIOP NOTE
CALLED DR. DAVIS OFFICE TO SEE IF THEY RECEIVED PREOP LABS AND EKG FAXED TO THEM THIS MORNING. KIRSTEN TOLD ME SHE DID NOT SEE THEM ON THE CHART. KIRSTEN ASKED ME TO REFAX THEM AND SAID SHE WOULD CALL ME BACK IF SHE DID NOT GET THEM.  PREOP ABNORMAL LABS HGAIC 8.7 (H) , CREATININE 11.10(H),AND PTT 32.8 (H), PATIENT IS ON ELIQUIS AND DIALYSIS

## 2024-05-21 ENCOUNTER — ANESTHESIA EVENT (OUTPATIENT)
Facility: HOSPITAL | Age: 44
End: 2024-05-21
Payer: COMMERCIAL

## 2024-05-22 ENCOUNTER — ANESTHESIA (OUTPATIENT)
Facility: HOSPITAL | Age: 44
End: 2024-05-22
Payer: COMMERCIAL

## 2024-05-22 ENCOUNTER — HOSPITAL ENCOUNTER (OUTPATIENT)
Facility: HOSPITAL | Age: 44
Setting detail: OUTPATIENT SURGERY
Discharge: HOME | End: 2024-05-22
Attending: SURGERY | Admitting: SURGERY
Payer: COMMERCIAL

## 2024-05-22 VITALS
BODY MASS INDEX: 40.8 KG/M2 | TEMPERATURE: 97.7 F | OXYGEN SATURATION: 97 % | WEIGHT: 285 LBS | HEART RATE: 83 BPM | DIASTOLIC BLOOD PRESSURE: 80 MMHG | HEIGHT: 70 IN | SYSTOLIC BLOOD PRESSURE: 141 MMHG | RESPIRATION RATE: 16 BRPM

## 2024-05-22 DIAGNOSIS — N18.6 ESRD (END STAGE RENAL DISEASE) (HCC): Primary | ICD-10-CM

## 2024-05-22 LAB
ANION GAP BLD CALC-SCNC: 5.8 MMOL/L (ref 10–20)
CA-I BLD-MCNC: 1.16 MMOL/L (ref 1.12–1.32)
CHLORIDE BLD-SCNC: 104 MMOL/L (ref 98–107)
CO2 BLD-SCNC: 30.2 MMOL/L (ref 21–32)
CREAT BLD-MCNC: 9.38 MG/DL (ref 0.6–1.3)
GLUCOSE BLD STRIP.AUTO-MCNC: 144 MG/DL (ref 65–117)
GLUCOSE BLD-MCNC: 182 MG/DL (ref 70–110)
POTASSIUM BLD-SCNC: 4.5 MMOL/L (ref 3.5–5.1)
SERVICE CMNT-IMP: ABNORMAL
SERVICE CMNT-IMP: ABNORMAL
SODIUM BLD-SCNC: 140 MMOL/L (ref 136–145)

## 2024-05-22 PROCEDURE — 7100000001 HC PACU RECOVERY - ADDTL 15 MIN: Performed by: SURGERY

## 2024-05-22 PROCEDURE — 3600000012 HC SURGERY LEVEL 2 ADDTL 15MIN: Performed by: SURGERY

## 2024-05-22 PROCEDURE — C1768 GRAFT, VASCULAR: HCPCS | Performed by: SURGERY

## 2024-05-22 PROCEDURE — 6360000002 HC RX W HCPCS: Performed by: STUDENT IN AN ORGANIZED HEALTH CARE EDUCATION/TRAINING PROGRAM

## 2024-05-22 PROCEDURE — 7100000010 HC PHASE II RECOVERY - FIRST 15 MIN: Performed by: SURGERY

## 2024-05-22 PROCEDURE — 2500000003 HC RX 250 WO HCPCS

## 2024-05-22 PROCEDURE — 2500000003 HC RX 250 WO HCPCS: Performed by: SURGERY

## 2024-05-22 PROCEDURE — 82962 GLUCOSE BLOOD TEST: CPT

## 2024-05-22 PROCEDURE — 2580000003 HC RX 258: Performed by: STUDENT IN AN ORGANIZED HEALTH CARE EDUCATION/TRAINING PROGRAM

## 2024-05-22 PROCEDURE — 7100000000 HC PACU RECOVERY - FIRST 15 MIN: Performed by: SURGERY

## 2024-05-22 PROCEDURE — 6370000000 HC RX 637 (ALT 250 FOR IP): Performed by: STUDENT IN AN ORGANIZED HEALTH CARE EDUCATION/TRAINING PROGRAM

## 2024-05-22 PROCEDURE — 80047 BASIC METABLC PNL IONIZED CA: CPT

## 2024-05-22 PROCEDURE — 7100000011 HC PHASE II RECOVERY - ADDTL 15 MIN: Performed by: SURGERY

## 2024-05-22 PROCEDURE — 2580000003 HC RX 258: Performed by: SURGERY

## 2024-05-22 PROCEDURE — 2709999900 HC NON-CHARGEABLE SUPPLY: Performed by: SURGERY

## 2024-05-22 PROCEDURE — 6360000002 HC RX W HCPCS

## 2024-05-22 PROCEDURE — 3700000001 HC ADD 15 MINUTES (ANESTHESIA): Performed by: SURGERY

## 2024-05-22 PROCEDURE — 3600000002 HC SURGERY LEVEL 2 BASE: Performed by: SURGERY

## 2024-05-22 PROCEDURE — 6360000002 HC RX W HCPCS: Performed by: SURGERY

## 2024-05-22 PROCEDURE — 3700000000 HC ANESTHESIA ATTENDED CARE: Performed by: SURGERY

## 2024-05-22 DEVICE — PROPATEN VASCULAR GRAFT SW 4-7MMX45CM TAPERED HEPARIN
Type: IMPLANTABLE DEVICE | Site: ARM | Status: FUNCTIONAL
Brand: GORE PROPATEN VASCULAR GRAFT

## 2024-05-22 RX ORDER — HYDRALAZINE HYDROCHLORIDE 20 MG/ML
10 INJECTION INTRAMUSCULAR; INTRAVENOUS
Status: DISCONTINUED | OUTPATIENT
Start: 2024-05-22 | End: 2024-05-22 | Stop reason: HOSPADM

## 2024-05-22 RX ORDER — CEFAZOLIN SODIUM 1 G/3ML
INJECTION, POWDER, FOR SOLUTION INTRAMUSCULAR; INTRAVENOUS PRN
Status: DISCONTINUED | OUTPATIENT
Start: 2024-05-22 | End: 2024-05-22 | Stop reason: SDUPTHER

## 2024-05-22 RX ORDER — ROPIVACAINE HYDROCHLORIDE 5 MG/ML
INJECTION, SOLUTION EPIDURAL; INFILTRATION; PERINEURAL
Status: COMPLETED | OUTPATIENT
Start: 2024-05-22 | End: 2024-05-22

## 2024-05-22 RX ORDER — SODIUM CHLORIDE, SODIUM LACTATE, POTASSIUM CHLORIDE, CALCIUM CHLORIDE 600; 310; 30; 20 MG/100ML; MG/100ML; MG/100ML; MG/100ML
INJECTION, SOLUTION INTRAVENOUS CONTINUOUS
Status: DISCONTINUED | OUTPATIENT
Start: 2024-05-22 | End: 2024-05-22 | Stop reason: HOSPADM

## 2024-05-22 RX ORDER — LIDOCAINE HYDROCHLORIDE 10 MG/ML
INJECTION, SOLUTION INFILTRATION; PERINEURAL PRN
Status: DISCONTINUED | OUTPATIENT
Start: 2024-05-22 | End: 2024-05-22 | Stop reason: HOSPADM

## 2024-05-22 RX ORDER — MIDAZOLAM HYDROCHLORIDE 2 MG/2ML
2 INJECTION, SOLUTION INTRAMUSCULAR; INTRAVENOUS
Status: COMPLETED | OUTPATIENT
Start: 2024-05-22 | End: 2024-05-22

## 2024-05-22 RX ORDER — PHENYLEPHRINE HCL IN 0.9% NACL 0.4MG/10ML
SYRINGE (ML) INTRAVENOUS PRN
Status: DISCONTINUED | OUTPATIENT
Start: 2024-05-22 | End: 2024-05-22 | Stop reason: SDUPTHER

## 2024-05-22 RX ORDER — HEPARIN SODIUM 1000 [USP'U]/ML
INJECTION, SOLUTION INTRAVENOUS; SUBCUTANEOUS PRN
Status: DISCONTINUED | OUTPATIENT
Start: 2024-05-22 | End: 2024-05-22 | Stop reason: SDUPTHER

## 2024-05-22 RX ORDER — DEXMEDETOMIDINE HYDROCHLORIDE 100 UG/ML
INJECTION, SOLUTION INTRAVENOUS PRN
Status: DISCONTINUED | OUTPATIENT
Start: 2024-05-22 | End: 2024-05-22 | Stop reason: SDUPTHER

## 2024-05-22 RX ORDER — ACETAMINOPHEN 500 MG
1000 TABLET ORAL ONCE
Status: COMPLETED | OUTPATIENT
Start: 2024-05-22 | End: 2024-05-22

## 2024-05-22 RX ORDER — NALOXONE HYDROCHLORIDE 0.4 MG/ML
INJECTION, SOLUTION INTRAMUSCULAR; INTRAVENOUS; SUBCUTANEOUS PRN
Status: DISCONTINUED | OUTPATIENT
Start: 2024-05-22 | End: 2024-05-22 | Stop reason: HOSPADM

## 2024-05-22 RX ORDER — SODIUM CHLORIDE 0.9 % (FLUSH) 0.9 %
5-40 SYRINGE (ML) INJECTION EVERY 12 HOURS SCHEDULED
Status: DISCONTINUED | OUTPATIENT
Start: 2024-05-22 | End: 2024-05-22 | Stop reason: HOSPADM

## 2024-05-22 RX ORDER — PROPOFOL 10 MG/ML
INJECTION, EMULSION INTRAVENOUS CONTINUOUS PRN
Status: DISCONTINUED | OUTPATIENT
Start: 2024-05-22 | End: 2024-05-22 | Stop reason: SDUPTHER

## 2024-05-22 RX ORDER — LIDOCAINE HYDROCHLORIDE 20 MG/ML
INJECTION, SOLUTION EPIDURAL; INFILTRATION; INTRACAUDAL; PERINEURAL PRN
Status: DISCONTINUED | OUTPATIENT
Start: 2024-05-22 | End: 2024-05-22 | Stop reason: SDUPTHER

## 2024-05-22 RX ORDER — SODIUM CHLORIDE 0.9 % (FLUSH) 0.9 %
5-40 SYRINGE (ML) INJECTION PRN
Status: DISCONTINUED | OUTPATIENT
Start: 2024-05-22 | End: 2024-05-22 | Stop reason: HOSPADM

## 2024-05-22 RX ORDER — HYDROMORPHONE HYDROCHLORIDE 1 MG/ML
0.5 INJECTION, SOLUTION INTRAMUSCULAR; INTRAVENOUS; SUBCUTANEOUS EVERY 5 MIN PRN
Status: DISCONTINUED | OUTPATIENT
Start: 2024-05-22 | End: 2024-05-22 | Stop reason: HOSPADM

## 2024-05-22 RX ORDER — OXYCODONE HYDROCHLORIDE 5 MG/1
5 TABLET ORAL EVERY 6 HOURS PRN
Qty: 28 TABLET | Refills: 0 | Status: SHIPPED | OUTPATIENT
Start: 2024-05-22 | End: 2024-05-29

## 2024-05-22 RX ORDER — SODIUM CHLORIDE 9 MG/ML
INJECTION, SOLUTION INTRAVENOUS PRN
Status: DISCONTINUED | OUTPATIENT
Start: 2024-05-22 | End: 2024-05-22 | Stop reason: HOSPADM

## 2024-05-22 RX ORDER — OXYCODONE HYDROCHLORIDE 5 MG/1
5 TABLET ORAL
Status: COMPLETED | OUTPATIENT
Start: 2024-05-22 | End: 2024-05-22

## 2024-05-22 RX ORDER — ONDANSETRON 2 MG/ML
4 INJECTION INTRAMUSCULAR; INTRAVENOUS
Status: DISCONTINUED | OUTPATIENT
Start: 2024-05-22 | End: 2024-05-22 | Stop reason: HOSPADM

## 2024-05-22 RX ORDER — PROCHLORPERAZINE EDISYLATE 5 MG/ML
5 INJECTION INTRAMUSCULAR; INTRAVENOUS
Status: DISCONTINUED | OUTPATIENT
Start: 2024-05-22 | End: 2024-05-22 | Stop reason: HOSPADM

## 2024-05-22 RX ORDER — LIDOCAINE HYDROCHLORIDE 10 MG/ML
1 INJECTION, SOLUTION EPIDURAL; INFILTRATION; INTRACAUDAL; PERINEURAL
Status: DISCONTINUED | OUTPATIENT
Start: 2024-05-22 | End: 2024-05-22 | Stop reason: HOSPADM

## 2024-05-22 RX ORDER — FENTANYL CITRATE 50 UG/ML
25 INJECTION, SOLUTION INTRAMUSCULAR; INTRAVENOUS EVERY 5 MIN PRN
Status: DISCONTINUED | OUTPATIENT
Start: 2024-05-22 | End: 2024-05-22 | Stop reason: HOSPADM

## 2024-05-22 RX ORDER — ONDANSETRON 2 MG/ML
INJECTION INTRAMUSCULAR; INTRAVENOUS PRN
Status: DISCONTINUED | OUTPATIENT
Start: 2024-05-22 | End: 2024-05-22 | Stop reason: SDUPTHER

## 2024-05-22 RX ORDER — FENTANYL CITRATE 50 UG/ML
100 INJECTION, SOLUTION INTRAMUSCULAR; INTRAVENOUS
Status: COMPLETED | OUTPATIENT
Start: 2024-05-22 | End: 2024-05-22

## 2024-05-22 RX ADMIN — DEXMEDETOMIDINE 4 MCG: 100 INJECTION, SOLUTION INTRAVENOUS at 07:43

## 2024-05-22 RX ADMIN — PROPOFOL 15 MG: 10 INJECTION, EMULSION INTRAVENOUS at 07:51

## 2024-05-22 RX ADMIN — SODIUM CHLORIDE: 9 INJECTION, SOLUTION INTRAVENOUS at 07:15

## 2024-05-22 RX ADMIN — DEXMEDETOMIDINE 4 MCG: 100 INJECTION, SOLUTION INTRAVENOUS at 07:30

## 2024-05-22 RX ADMIN — DEXMEDETOMIDINE 4 MCG: 100 INJECTION, SOLUTION INTRAVENOUS at 07:37

## 2024-05-22 RX ADMIN — Medication 80 MCG: at 08:12

## 2024-05-22 RX ADMIN — HEPARIN SODIUM 8000 UNITS: 1000 INJECTION INTRAVENOUS; SUBCUTANEOUS at 08:07

## 2024-05-22 RX ADMIN — ONDANSETRON 4 MG: 2 INJECTION INTRAMUSCULAR; INTRAVENOUS at 07:46

## 2024-05-22 RX ADMIN — DEXMEDETOMIDINE 4 MCG: 100 INJECTION, SOLUTION INTRAVENOUS at 07:34

## 2024-05-22 RX ADMIN — MIDAZOLAM 1 MG: 1 INJECTION INTRAMUSCULAR; INTRAVENOUS at 07:10

## 2024-05-22 RX ADMIN — Medication 40 MCG: at 08:58

## 2024-05-22 RX ADMIN — ACETAMINOPHEN 1000 MG: 500 TABLET ORAL at 06:31

## 2024-05-22 RX ADMIN — PROPOFOL 75 MCG/KG/MIN: 10 INJECTION, EMULSION INTRAVENOUS at 07:32

## 2024-05-22 RX ADMIN — DEXMEDETOMIDINE 4 MCG: 100 INJECTION, SOLUTION INTRAVENOUS at 07:26

## 2024-05-22 RX ADMIN — ROPIVACAINE HYDROCHLORIDE 30 ML: 5 INJECTION, SOLUTION EPIDURAL; INFILTRATION; PERINEURAL at 07:10

## 2024-05-22 RX ADMIN — OXYCODONE 5 MG: 5 TABLET ORAL at 10:11

## 2024-05-22 RX ADMIN — CEFAZOLIN 3 G: 1 INJECTION, POWDER, FOR SOLUTION INTRAMUSCULAR; INTRAVENOUS at 07:34

## 2024-05-22 RX ADMIN — FENTANYL CITRATE 50 MCG: 50 INJECTION INTRAMUSCULAR; INTRAVENOUS at 07:10

## 2024-05-22 RX ADMIN — LIDOCAINE HYDROCHLORIDE 60 MG: 20 INJECTION, SOLUTION EPIDURAL; INFILTRATION; INTRACAUDAL; PERINEURAL at 07:32

## 2024-05-22 ASSESSMENT — PAIN - FUNCTIONAL ASSESSMENT
PAIN_FUNCTIONAL_ASSESSMENT: 0-10
PAIN_FUNCTIONAL_ASSESSMENT: NONE - DENIES PAIN

## 2024-05-22 ASSESSMENT — PAIN DESCRIPTION - DESCRIPTORS: DESCRIPTORS: ACHING

## 2024-05-22 NOTE — DISCHARGE INSTRUCTIONS
Can remove dressing in 48 hours and can cover with gauze/tape or large band-aids.  Please call [phone] for follow up appointment.      Hemodialysis Access Surgery: What to Expect at Home  Your Recovery  Hemodialysis is a way to remove wastes from the blood when your kidneys can no longer do the job. It's not a cure, but it can help you live longer and feel better. It's a lifesaving treatment when you have kidney failure. Hemodialysis is often called dialysis. Your doctor created a place (called an access) in your arm for your blood to flow in and out of your body during your dialysis sessions.  Your arm will probably be bruised and swollen. It may hurt. The cut (incision) may bleed. The pain and bleeding will get better over several days. You will probably need only over-the-counter pain medicine. You can reduce swelling by propping up your arm on 1 or 2 pillows and keeping your elbow straight.  You will have stitches. These may dissolve on their own, or your doctor will tell you when to come in to have them removed. You should also be able to return to work in a few days.  You may feel some coolness or numbness in your hand. These feelings usually go away in a few weeks. Your doctor may suggest squeezing a soft object. This will strengthen your access and may make hemodialysis faster and easier.  You should always be able to feel blood rushing through the fistula or graft. It feels like a slight vibration when you put your fingers on the skin over the fistula or graft. This feeling is called a thrill or a pulse.  This care sheet gives you a general idea about how long it will take for you to recover. But each person recovers at a different pace. Follow the steps below to get better as quickly as possible.  How can you care for yourself at home?  Activity    Rest when you feel tired. Getting enough sleep will help you recover. Do not lie on or sleep on the arm with the access.     Avoid activities such as

## 2024-05-22 NOTE — ANESTHESIA POSTPROCEDURE EVALUATION
Department of Anesthesiology  Postprocedure Note    Patient: Jony Ellison  MRN: [id number]  YOB: 1980  Date of evaluation: 5/22/2024    Procedure Summary       Date: 05/22/24 Room / Location: [facility] MAIN OR [facility] MAIN OR    Anesthesia Start: 0726 Anesthesia Stop: 0915    Procedure: LEFT UPPER ARM GRAFT (Left: Arm Upper) Diagnosis:       End stage renal disease (HCC)      (End stage renal disease (HCC) [N18.6])    Providers: Yousif López MD Responsible Provider: Anil Jarrett DO    Anesthesia Type: MAC, Regional ASA Status: 3            Anesthesia Type: MAC, Regional    Hortensia Phase I: Hortensia Score: 9    Hortensia Phase II: Hortensia Score: 10    Anesthesia Post Evaluation    Patient location during evaluation: PACU  Patient participation: complete - patient participated  Level of consciousness: awake  Airway patency: patent  Nausea & Vomiting: no nausea and no vomiting  Cardiovascular status: blood pressure returned to baseline  Respiratory status: acceptable  Pain management: adequate    No notable events documented.

## 2024-05-22 NOTE — OP NOTE
[address]  [address]                            OPERATIVE REPORT      PATIENT NAME: LARISSA KAPOOR                   : 1980  MED REC NO: [id number]                       ROOM: OR  ACCOUNT NO: [id number]                       ADMIT DATE: 2024  PROVIDER: Yousif López MD    DATE OF SERVICE:  2024    PREOPERATIVE DIAGNOSES:  End-stage renal disease.    POSTOPERATIVE DIAGNOSES:  End-stage renal disease.    PROCEDURES PERFORMED:  Left upper arm arteriovenous graft placement.    SURGEON:  Yousif López MD    ASSISTANT:  Danielle Vaz SA.    ANESTHESIA:  Regional with sedation.    ESTIMATED BLOOD LOSS:  Minimal.    SPECIMENS REMOVED:  None.    INTRAOPERATIVE FINDINGS:  See below.     COMPLICATIONS:  None    IMPLANTS:  4 x 7 [place] tapered graft.    INDICATIONS:  The patient is a 43-year-old gentleman with end-stage renal disease and numerous problems with dialysis access stemming from [facility] amongst other places.  He currently has presented after failed bilateral arm procedures and requests for a left upper arm graft placement.  Risks and benefits of the procedure were discussed preoperatively with the patient.  He voiced understanding and wished to proceed    DESCRIPTION OF PROCEDURE:  The patient was placed supine on the operating room table, and regional analgesia was established.  The left arm was prepped and draped in the standard surgical fashion.  The block was tested and a longitudinal incision was made in the upper arm.  This did require some administration of further 1% lidocaine.  Following this, the incision was deepened through the soft tissue to the fascia.  The brachial artery and vein were identified here.  I did have to extend the excision more proximally to obtain an adequate-sized vein, but this was done.  The artery seemed adequate as well, but because of the patient's history of steal syndrome

## 2024-05-22 NOTE — H&P
Smoking status: Some Days     Types: Cigars     Passive exposure: Current (about 5 cigars per week)    Smokeless tobacco: Never    Tobacco comments:     Quit smokin-3 cigarettes a day   Vaping Use    Vaping Use: Never used   Substance and Sexual Activity    Alcohol use: Not Currently     Comment: ONCE PER YEAR    Drug use: Yes     Types: Marijuana (Weed)     Comment: 1-2 TIMES PER YEAR    Sexual activity: Not on file   Other Topics Concern    Not on file   Social History Narrative    Not on file     Social Determinants of Health     Financial Resource Strain: Not on file   Food Insecurity: Not on file   Transportation Needs: Not on file   Physical Activity: Not on file   Stress: Not on file   Social Connections: Not on file   Intimate Partner Violence: Not on file   Housing Stability: Not on file      Home Medications:   Prior to Admission medications    Medication Sig Start Date End Date Taking? Authorizing Provider   apixaban (ELIQUIS) 2.5 MG TABS tablet Take 1 tablet by mouth 2 times daily    Christiano Crenshaw MD   midodrine (PROAMATINE) 5 MG tablet Take 1 tablet by mouth daily TAKE ONE TABLET 30 MINUTES PRIOR TO DIALYSIS ON TUESDAY, THURSDAY AND     ProviderChristiano MD   atorvastatin (LIPITOR) 10 MG tablet Take 1 tablet by mouth daily    ProviderChristiano MD   Lancets MISC Twice daily 10/28/13   Automatic Reconciliation, Ar   bumetanide (BUMEX) 1 MG tablet Take 1 tablet by mouth 2 times daily    Automatic Reconciliation, Ar   gabapentin (NEURONTIN) 800 MG tablet Take 1 tablet by mouth daily.    Automatic Reconciliation, Ar   hydrALAZINE (APRESOLINE) 100 MG tablet Take 1 tablet by mouth 3 times daily    Automatic Reconciliation, Ar   insulin aspart protamine-insulin aspart (NOVOLOG 70/30) (70-30) 100 UNIT/ML injection Inject 50 Units into the skin 3 times daily 50 UNITS AT BREAKFAST, 50 UNITS AT LUNCH, 20 UNITS AT DINNER    Automatic Reconciliation, Ar   sevelamer (RENVELA) 800 MG

## 2024-05-22 NOTE — BRIEF OP NOTE
Brief Postoperative Note      Patient: Jony Ellison  YOB: 1980  MRN: [id number]    Date of Procedure: 5/22/2024    Pre-Op Diagnosis Codes:     * End stage renal disease (HCC) [N18.6]    Post-Op Diagnosis: Same       Procedure(s):  LEFT UPPER ARM GRAFT    Surgeon(s):  Yousif López MD    Assistant:  Surgical Assistant: Danielle Vaz    Anesthesia: Monitor Anesthesia Care    Estimated Blood Loss (mL): Minimal    Complications: None    Specimens:   * No specimens in log *    Implants:  Implant Name Type Inv. Item Serial No.  Lot No. LRB No. Used Action   GRAFT VASC L45CM DIA4-7MM PTFE CBAS HEP SURF STD WALLED - [id number] Vascular grafts GRAFT VASC L45CM DIA4-7MM PTFE CBAS HEP SURF STD WALLED [id number]  [employer] NA Left 1 Implanted         Drains: * No LDAs found *    Findings:  Infection Present At Time Of Surgery (PATOS) (choose all levels that have infection present):  No infection present  Other Findings: adequate artery/vein    Electronically signed by Yousif López MD on 5/22/2024 at 9:18 AM

## 2024-05-22 NOTE — ANESTHESIA PRE PROCEDURE
Neuro/Psych:   Negative Neuro/Psych ROS              GI/Hepatic/Renal:   (+) renal disease: dialysis and ESRD, morbid obesity         ROS comment: Dialysis 5/21/24.   Endo/Other:    (+) DiabetesType II DM, using insulin.                 Abdominal:   (+) obese          Vascular: negative vascular ROS.         Other Findings:       Anesthesia Plan      MAC and regional     ASA 3           MIPS: Prophylactic antiemetics administered.  Anesthetic plan and risks discussed with patient.              Post-op pain plan if not by surgeon: single peripheral nerve block        Anil Jarrett DO   5/22/2024

## 2024-05-22 NOTE — ANESTHESIA PROCEDURE NOTES
Peripheral Block    Patient location during procedure: pre-op  Reason for block: primary anesthetic  Start time: 5/22/2024 7:10 AM  End time: 5/22/2024 7:14 AM  Staffing  Performed: anesthesiologist   Anesthesiologist: Anil Jarrett DO  Performed by: Anil Jarrett DO  Authorized by: Anil Jarrett DO    Preanesthetic Checklist  Completed: patient identified, IV checked, site marked, risks and benefits discussed, surgical/procedural consents, equipment checked, pre-op evaluation, timeout performed, anesthesia consent given, oxygen available, monitors applied/VS acknowledged and fire risk safety assessment completed and verbalized  Peripheral Block   Patient position: sitting  Prep: ChloraPrep  Provider prep: mask and sterile gloves  Patient monitoring: cardiac monitor, continuous pulse ox, frequent blood pressure checks, IV access, oxygen and responsive to questions  Block type: Brachial plexus  Supraclavicular  Laterality: left  Injection technique: single-shot  Guidance: ultrasound guided    Needle   Needle type: insulated echogenic nerve stimulator needle   Needle gauge: 21 G  Needle localization: ultrasound guidance  Needle length: 10 cm  Assessment   Injection assessment: negative aspiration for heme, local visualized surrounding nerve on ultrasound, no intravascular symptoms and no paresthesia on injection  Slow fractionated injection: yes  Hemodynamics: stable  Outcomes: uncomplicated and patient tolerated procedure well    Medications Administered  ropivacaine (NAROPIN) injection 0.5% - Perineural   30 mL - 5/22/2024 7:10:00 AM

## (undated) DEVICE — SUTURE PROL SZ 5-0 L30IN NONABSORBABLE BLU L13MM RB-2 1/2 8710H

## (undated) DEVICE — GOWN,SIRUS,NONRNF,SETINSLV,2XL,18/CS: Brand: MEDLINE

## (undated) DEVICE — SUTURE PROL 5-0 L18IN NONABSORBABLE BLU RB-2 L13MM 1/2 CIR 8713H

## (undated) DEVICE — INTENT OT USE PROVIDES A STERILE INTERFACE BETWEEN THE OPERATING ROOM SURGICAL LAMPS (NON-STERILE) AND THE SURGEON OR STAFF WORKING IN THE STERILE FIELD.: Brand: ASPEN® ALC PLUS LIGHT HANDLE COVER

## (undated) DEVICE — SUTURE PROL SZ 6-0 L24IN NONABSORBABLE BLU L9.3MM BV-1 3/8 8805H

## (undated) DEVICE — SUTURE VICRYL + SZ 3-0 L27IN ABSRB UD L26MM SH 1/2 CIR VCP416H

## (undated) DEVICE — X-RAY DETECTABLE SPONGES,16 PLY: Brand: VISTEC

## (undated) DEVICE — BANDAGE,GAUZE,BULKEE II,4.5"X4.1YD,STRL: Brand: MEDLINE

## (undated) DEVICE — PENCIL ES CRD L10FT HND SWCHING ROCK SWCH W/ EDGE COAT BLDE

## (undated) DEVICE — SOLUTION IV 500ML 0.9% SOD CHL PH 5 INJ USP VIAFLX PLAS

## (undated) DEVICE — GLOVE SURG SZ 7 L12IN FNGR THK79MIL GRN LTX FREE

## (undated) DEVICE — SUTURE PROL SZ 5-0 L24IN NONABSORBABLE BLU RB-2 L13IN 1/2 8554H

## (undated) DEVICE — PROBE VASC 8MHZ WTRPRF

## (undated) DEVICE — 3M™ MEDIPORE™ H SOFT CLOTH SURGICAL TAPE 2864, 4 INCH X 10 YARD (10CM X 9,14M), 12 ROLLS/CASE: Brand: 3M™ MEDIPORE™

## (undated) DEVICE — GLOVE ORANGE PI 7   MSG9070

## (undated) DEVICE — SUTURE VCRL SZ 3-0 L27IN ABSRB UD L26MM SH 1/2 CIR J416H

## (undated) DEVICE — GLOVE SURG SZ 8 CRM LTX FREE POLYISOPRENE POLYMER BEAD ANTI

## (undated) DEVICE — SPONGE GZ W4XL4IN COT 12 PLY TYP VII WVN C FLD DSGN STERILE

## (undated) DEVICE — STAPLER SKIN H3.9MM WIRE DIA0.58MM CRWN 6.9MM 35 STPL ROT

## (undated) DEVICE — AV FISTULA - MRMC: Brand: MEDLINE INDUSTRIES, INC.

## (undated) DEVICE — Device

## (undated) DEVICE — GAUZE,SPONGE,4"X4",16PLY,STRL,LF,10/TRAY: Brand: MEDLINE

## (undated) DEVICE — AGENT HEMSTAT W4XL4IN OXIDIZED REGENERATED CELOS ABSRB SFT

## (undated) DEVICE — SUTURE PERMAHAND SZ 3-0 L30IN NONABSORBABLE BLK SILK BRAID A304H

## (undated) DEVICE — LIQUIBAND RAPID ADHESIVE 36/CS 0.8ML: Brand: MEDLINE